# Patient Record
Sex: MALE | Race: WHITE | Employment: OTHER | URBAN - METROPOLITAN AREA
[De-identification: names, ages, dates, MRNs, and addresses within clinical notes are randomized per-mention and may not be internally consistent; named-entity substitution may affect disease eponyms.]

---

## 2017-01-16 ENCOUNTER — HOSPITAL ENCOUNTER (OUTPATIENT)
Dept: SURGERY | Age: 57
Discharge: HOME | End: 2017-01-16
Attending: SURGERY | Admitting: SURGERY

## 2017-01-16 VITALS
SYSTOLIC BLOOD PRESSURE: 126 MMHG | OXYGEN SATURATION: 94 % | HEART RATE: 66 BPM | TEMPERATURE: 96.8 F | DIASTOLIC BLOOD PRESSURE: 81 MMHG | RESPIRATION RATE: 16 BRPM

## 2017-01-16 RX ORDER — LABETALOL HYDROCHLORIDE 5 MG/ML
5 INJECTION, SOLUTION INTRAVENOUS EVERY 10 MIN PRN
Status: DISCONTINUED | OUTPATIENT
Start: 2017-01-16 | End: 2017-01-17 | Stop reason: HOSPADM

## 2017-01-16 RX ORDER — DIPHENHYDRAMINE HYDROCHLORIDE 50 MG/ML
12.5 INJECTION INTRAMUSCULAR; INTRAVENOUS
Status: ACTIVE | OUTPATIENT
Start: 2017-01-16 | End: 2017-01-16

## 2017-01-16 RX ORDER — OXYCODONE HYDROCHLORIDE AND ACETAMINOPHEN 5; 325 MG/1; MG/1
TABLET ORAL
Qty: 40 TABLET | Refills: 0 | Status: SHIPPED | OUTPATIENT
Start: 2017-01-16 | End: 2017-01-23

## 2017-01-16 RX ORDER — SODIUM CHLORIDE 0.9 % (FLUSH) 0.9 %
10 SYRINGE (ML) INJECTION EVERY 12 HOURS SCHEDULED
Status: DISCONTINUED | OUTPATIENT
Start: 2017-01-16 | End: 2017-01-17 | Stop reason: HOSPADM

## 2017-01-16 RX ORDER — PROMETHAZINE HYDROCHLORIDE 25 MG/ML
6.25 INJECTION, SOLUTION INTRAMUSCULAR; INTRAVENOUS
Status: ACTIVE | OUTPATIENT
Start: 2017-01-16 | End: 2017-01-16

## 2017-01-16 RX ORDER — MEPERIDINE HYDROCHLORIDE 50 MG/ML
12.5 INJECTION INTRAMUSCULAR; INTRAVENOUS; SUBCUTANEOUS EVERY 5 MIN PRN
Status: DISCONTINUED | OUTPATIENT
Start: 2017-01-16 | End: 2017-01-17 | Stop reason: HOSPADM

## 2017-01-16 RX ORDER — OXYCODONE HYDROCHLORIDE AND ACETAMINOPHEN 5; 325 MG/1; MG/1
1 TABLET ORAL PRN
Status: ACTIVE | OUTPATIENT
Start: 2017-01-16 | End: 2017-01-16

## 2017-01-16 RX ORDER — MORPHINE SULFATE 10 MG/ML
2 INJECTION, SOLUTION INTRAMUSCULAR; INTRAVENOUS EVERY 5 MIN PRN
Status: DISCONTINUED | OUTPATIENT
Start: 2017-01-16 | End: 2017-01-17 | Stop reason: HOSPADM

## 2017-01-16 RX ORDER — HYDRALAZINE HYDROCHLORIDE 20 MG/ML
5 INJECTION INTRAMUSCULAR; INTRAVENOUS EVERY 10 MIN PRN
Status: DISCONTINUED | OUTPATIENT
Start: 2017-01-16 | End: 2017-01-17 | Stop reason: HOSPADM

## 2017-01-16 RX ORDER — SODIUM CHLORIDE 0.9 % (FLUSH) 0.9 %
10 SYRINGE (ML) INJECTION PRN
Status: DISCONTINUED | OUTPATIENT
Start: 2017-01-16 | End: 2017-01-17 | Stop reason: HOSPADM

## 2017-01-16 RX ORDER — ONDANSETRON 2 MG/ML
4 INJECTION INTRAMUSCULAR; INTRAVENOUS
Status: ACTIVE | OUTPATIENT
Start: 2017-01-16 | End: 2017-01-16

## 2017-01-16 RX ORDER — MORPHINE SULFATE 2 MG/ML
1 INJECTION, SOLUTION INTRAMUSCULAR; INTRAVENOUS EVERY 5 MIN PRN
Status: DISCONTINUED | OUTPATIENT
Start: 2017-01-16 | End: 2017-01-17 | Stop reason: HOSPADM

## 2017-01-16 RX ORDER — SODIUM CHLORIDE, SODIUM LACTATE, POTASSIUM CHLORIDE, CALCIUM CHLORIDE 600; 310; 30; 20 MG/100ML; MG/100ML; MG/100ML; MG/100ML
INJECTION, SOLUTION INTRAVENOUS CONTINUOUS
Status: DISCONTINUED | OUTPATIENT
Start: 2017-01-16 | End: 2017-01-17 | Stop reason: HOSPADM

## 2017-01-16 RX ORDER — LIDOCAINE HYDROCHLORIDE 10 MG/ML
1 INJECTION, SOLUTION EPIDURAL; INFILTRATION; INTRACAUDAL; PERINEURAL
Status: COMPLETED | OUTPATIENT
Start: 2017-01-16 | End: 2017-01-16

## 2017-01-16 RX ORDER — OXYCODONE HYDROCHLORIDE AND ACETAMINOPHEN 5; 325 MG/1; MG/1
2 TABLET ORAL PRN
Status: ACTIVE | OUTPATIENT
Start: 2017-01-16 | End: 2017-01-16

## 2017-01-16 RX ADMIN — LIDOCAINE HYDROCHLORIDE 0.1 ML: 10 INJECTION, SOLUTION EPIDURAL; INFILTRATION; INTRACAUDAL; PERINEURAL at 08:36

## 2017-01-16 RX ADMIN — SODIUM CHLORIDE, SODIUM LACTATE, POTASSIUM CHLORIDE, CALCIUM CHLORIDE: 600; 310; 30; 20 INJECTION, SOLUTION INTRAVENOUS at 08:40

## 2017-01-16 ASSESSMENT — PAIN - FUNCTIONAL ASSESSMENT: PAIN_FUNCTIONAL_ASSESSMENT: 0-10

## 2017-01-16 ASSESSMENT — PAIN SCALES - GENERAL
PAINLEVEL_OUTOF10: 2

## 2017-06-18 PROBLEM — R55 SYNCOPE AND COLLAPSE: Status: ACTIVE | Noted: 2017-06-18

## 2017-06-19 PROBLEM — H57.11 ACUTE RIGHT EYE PAIN: Status: ACTIVE | Noted: 2017-06-19

## 2017-06-21 ENCOUNTER — TELEPHONE (OUTPATIENT)
Dept: NEUROLOGY | Age: 57
End: 2017-06-21

## 2017-06-27 ENCOUNTER — ANTI-COAG VISIT (OUTPATIENT)
Dept: PHARMACY | Facility: CLINIC | Age: 57
End: 2017-06-27

## 2017-06-27 ENCOUNTER — HOSPITAL ENCOUNTER (OUTPATIENT)
Dept: OTHER | Age: 57
Discharge: HOME | End: 2017-06-30
Attending: INTERNAL MEDICINE | Admitting: INTERNAL MEDICINE

## 2017-06-27 LAB — INR BLD: 1.5

## 2017-06-29 ENCOUNTER — ANTI-COAG VISIT (OUTPATIENT)
Dept: PHARMACY | Facility: CLINIC | Age: 57
End: 2017-06-29

## 2017-06-29 LAB — INR BLD: 2.1

## 2017-07-01 ENCOUNTER — HOSPITAL ENCOUNTER (OUTPATIENT)
Dept: OTHER | Age: 57
Discharge: HOME | End: 2017-07-31
Attending: INTERNAL MEDICINE | Admitting: INTERNAL MEDICINE

## 2017-07-03 ENCOUNTER — ANTI-COAG VISIT (OUTPATIENT)
Dept: PHARMACY | Facility: CLINIC | Age: 57
End: 2017-07-03

## 2017-07-03 LAB — INR BLD: 2.1

## 2017-07-07 ENCOUNTER — TELEPHONE (OUTPATIENT)
Dept: NEUROLOGY | Age: 57
End: 2017-07-07

## 2017-07-10 ENCOUNTER — ANTI-COAG VISIT (OUTPATIENT)
Dept: PHARMACY | Facility: CLINIC | Age: 57
End: 2017-07-10

## 2017-07-10 LAB — INR BLD: 2.1

## 2017-07-19 ENCOUNTER — OFFICE VISIT (OUTPATIENT)
Dept: OTHER | Age: 57
End: 2017-07-19

## 2017-07-19 ENCOUNTER — HOSPITAL ENCOUNTER (OUTPATIENT)
Dept: OTHER | Age: 57
Discharge: HOME | End: 2017-07-19
Attending: INTERNAL MEDICINE | Admitting: INTERNAL MEDICINE

## 2017-07-19 VITALS
HEIGHT: 72 IN | WEIGHT: 220 LBS | OXYGEN SATURATION: 97 % | DIASTOLIC BLOOD PRESSURE: 80 MMHG | SYSTOLIC BLOOD PRESSURE: 133 MMHG | BODY MASS INDEX: 29.8 KG/M2 | HEART RATE: 66 BPM

## 2017-07-19 DIAGNOSIS — I10 ESSENTIAL HYPERTENSION: ICD-10-CM

## 2017-07-19 DIAGNOSIS — E78.00 PURE HYPERCHOLESTEROLEMIA: ICD-10-CM

## 2017-07-19 DIAGNOSIS — I63.111 CEREBROVASCULAR ACCIDENT (CVA) DUE TO EMBOLISM OF RIGHT VERTEBRAL ARTERY (HCC): Primary | ICD-10-CM

## 2017-07-19 PROCEDURE — 99999 PR OFFICE/OUTPT VISIT,PROCEDURE ONLY: CPT | Performed by: INTERNAL MEDICINE

## 2017-07-19 RX ORDER — ATORVASTATIN CALCIUM 40 MG/1
40 TABLET, FILM COATED ORAL NIGHTLY
Qty: 30 TABLET | Refills: 5 | Status: SHIPPED | OUTPATIENT
Start: 2017-07-19 | End: 2017-11-13 | Stop reason: SDUPTHER

## 2017-07-19 RX ORDER — ASPIRIN 81 MG/1
81 TABLET, CHEWABLE ORAL DAILY
Qty: 30 TABLET | Refills: 5 | Status: SHIPPED | OUTPATIENT
Start: 2017-07-19 | End: 2020-03-02

## 2017-07-19 RX ORDER — WARFARIN SODIUM 7.5 MG/1
7.5 TABLET ORAL DAILY
Qty: 30 TABLET | Refills: 2 | Status: SHIPPED | OUTPATIENT
Start: 2017-07-19 | End: 2017-08-31 | Stop reason: SDUPTHER

## 2017-07-19 RX ORDER — LISINOPRIL 10 MG/1
10 TABLET ORAL DAILY
Qty: 30 TABLET | Refills: 5 | Status: SHIPPED | OUTPATIENT
Start: 2017-07-19 | End: 2017-10-02

## 2017-07-20 ENCOUNTER — OFFICE VISIT (OUTPATIENT)
Dept: NEUROLOGY | Age: 57
End: 2017-07-20

## 2017-07-20 VITALS
HEART RATE: 62 BPM | SYSTOLIC BLOOD PRESSURE: 137 MMHG | OXYGEN SATURATION: 98 % | WEIGHT: 230 LBS | HEIGHT: 72 IN | DIASTOLIC BLOOD PRESSURE: 87 MMHG | BODY MASS INDEX: 31.15 KG/M2

## 2017-07-20 DIAGNOSIS — G43.809 VARIANTS OF MIGRAINE: ICD-10-CM

## 2017-07-20 DIAGNOSIS — E78.5 DYSLIPIDEMIA: ICD-10-CM

## 2017-07-20 DIAGNOSIS — I10 HTN (HYPERTENSION), BENIGN: ICD-10-CM

## 2017-07-20 DIAGNOSIS — I63.111 CEREBROVASCULAR ACCIDENT (CVA) DUE TO EMBOLISM OF RIGHT VERTEBRAL ARTERY (HCC): Primary | ICD-10-CM

## 2017-07-20 PROCEDURE — 99214 OFFICE O/P EST MOD 30 MIN: CPT | Performed by: PSYCHIATRY & NEUROLOGY

## 2017-07-24 ENCOUNTER — ANTI-COAG VISIT (OUTPATIENT)
Dept: PHARMACY | Facility: CLINIC | Age: 57
End: 2017-07-24

## 2017-07-24 LAB — INR BLD: 1.2

## 2017-07-31 ENCOUNTER — ANTI-COAG VISIT (OUTPATIENT)
Dept: PHARMACY | Facility: CLINIC | Age: 57
End: 2017-07-31

## 2017-07-31 LAB — INR BLD: 1.7

## 2017-08-07 ENCOUNTER — ANTI-COAG VISIT (OUTPATIENT)
Dept: PHARMACY | Facility: CLINIC | Age: 57
End: 2017-08-07

## 2017-08-07 LAB — INR BLD: 1.8

## 2017-08-14 ENCOUNTER — ANTI-COAG VISIT (OUTPATIENT)
Dept: PHARMACY | Facility: CLINIC | Age: 57
End: 2017-08-14

## 2017-08-14 LAB — INR BLD: 2.8

## 2017-08-28 ENCOUNTER — ANTI-COAG VISIT (OUTPATIENT)
Dept: PHARMACY | Facility: CLINIC | Age: 57
End: 2017-08-28

## 2017-08-28 LAB — INR BLD: 1.7

## 2017-08-31 ENCOUNTER — HOSPITAL ENCOUNTER (OUTPATIENT)
Dept: OTHER | Age: 57
Discharge: HOME | End: 2017-08-31
Attending: INTERNAL MEDICINE | Admitting: INTERNAL MEDICINE

## 2017-08-31 ENCOUNTER — OFFICE VISIT (OUTPATIENT)
Dept: OTHER | Age: 57
End: 2017-08-31

## 2017-08-31 VITALS
OXYGEN SATURATION: 98 % | WEIGHT: 220 LBS | DIASTOLIC BLOOD PRESSURE: 72 MMHG | BODY MASS INDEX: 29.8 KG/M2 | HEART RATE: 70 BPM | SYSTOLIC BLOOD PRESSURE: 130 MMHG | HEIGHT: 72 IN

## 2017-08-31 DIAGNOSIS — I10 ESSENTIAL HYPERTENSION: ICD-10-CM

## 2017-08-31 DIAGNOSIS — I63.111 CEREBROVASCULAR ACCIDENT (CVA) DUE TO EMBOLISM OF RIGHT VERTEBRAL ARTERY (HCC): Primary | ICD-10-CM

## 2017-08-31 DIAGNOSIS — R05.8 COUGH DUE TO ACE INHIBITOR: ICD-10-CM

## 2017-08-31 DIAGNOSIS — T46.4X5A COUGH DUE TO ACE INHIBITOR: ICD-10-CM

## 2017-08-31 DIAGNOSIS — E78.00 PURE HYPERCHOLESTEROLEMIA: ICD-10-CM

## 2017-08-31 PROCEDURE — 99999 PR OFFICE/OUTPT VISIT,PROCEDURE ONLY: CPT | Performed by: INTERNAL MEDICINE

## 2017-08-31 RX ORDER — WARFARIN SODIUM 7.5 MG/1
7.5 TABLET ORAL DAILY
Qty: 30 TABLET | Refills: 5 | Status: SHIPPED | OUTPATIENT
Start: 2017-08-31 | End: 2017-09-25 | Stop reason: SDUPTHER

## 2017-08-31 RX ORDER — AMLODIPINE BESYLATE 5 MG/1
5 TABLET ORAL DAILY
Qty: 30 TABLET | Refills: 5 | Status: SHIPPED | OUTPATIENT
Start: 2017-08-31 | End: 2017-11-13 | Stop reason: SDUPTHER

## 2017-09-11 ENCOUNTER — ANTI-COAG VISIT (OUTPATIENT)
Dept: PHARMACY | Facility: CLINIC | Age: 57
End: 2017-09-11

## 2017-09-11 LAB — INR BLD: 2.1

## 2017-09-25 RX ORDER — WARFARIN SODIUM 7.5 MG/1
7.5 TABLET ORAL DAILY
Qty: 30 TABLET | Refills: 5 | Status: SHIPPED | OUTPATIENT
Start: 2017-09-25 | End: 2017-11-08 | Stop reason: SDUPTHER

## 2017-10-02 ENCOUNTER — ANTI-COAG VISIT (OUTPATIENT)
Dept: PHARMACY | Facility: CLINIC | Age: 57
End: 2017-10-02

## 2017-10-02 ENCOUNTER — OFFICE VISIT (OUTPATIENT)
Dept: OTHER | Age: 57
End: 2017-10-02

## 2017-10-02 ENCOUNTER — HOSPITAL ENCOUNTER (OUTPATIENT)
Dept: OTHER | Age: 57
Discharge: HOME | End: 2017-10-02
Attending: INTERNAL MEDICINE | Admitting: INTERNAL MEDICINE

## 2017-10-02 VITALS
WEIGHT: 225 LBS | DIASTOLIC BLOOD PRESSURE: 82 MMHG | HEIGHT: 72 IN | SYSTOLIC BLOOD PRESSURE: 142 MMHG | BODY MASS INDEX: 30.48 KG/M2

## 2017-10-02 DIAGNOSIS — I10 ESSENTIAL HYPERTENSION: ICD-10-CM

## 2017-10-02 DIAGNOSIS — I63.111 CEREBROVASCULAR ACCIDENT (CVA) DUE TO EMBOLISM OF RIGHT VERTEBRAL ARTERY (HCC): Primary | ICD-10-CM

## 2017-10-02 DIAGNOSIS — E78.00 PURE HYPERCHOLESTEROLEMIA: ICD-10-CM

## 2017-10-02 LAB
HBA1C MFR BLD: 5.5 %
INR BLD: 2.2

## 2017-10-02 PROCEDURE — 99999 PR OFFICE/OUTPT VISIT,PROCEDURE ONLY: CPT | Performed by: INTERNAL MEDICINE

## 2017-10-02 PROCEDURE — 83036 HEMOGLOBIN GLYCOSYLATED A1C: CPT | Performed by: INTERNAL MEDICINE

## 2017-10-02 NOTE — PROGRESS NOTES
Mr. Vickie Vincent is here for management of anticoagulation for TIA. PMH also significant for HTN and Dyslipidemia. He presents today w/out complaint. Pt verifies dosing regimen as listed above. Pt denies s/s bleeding/bruising/swelling/SOB. No BRBPR. No melena. Has had fewer of the very high Vit K foods this week  No changes in RX/OTCs/Herbal medications. Pt denies tobacco and drinks EtOH occasionally. He has been sticking to 2 servings per week of vit K. INR 2.2 is within therapeutic range of 2-3. Recommend to continue current warfarin regimen of 11.25mg daily. Patient has 7.5mg tablets. Will continue to monitor and check INR in 4 weeks. Dosing reminder card given with phone number, appointment date and time.    Return to clinic: 10/30/2017 @ 8:00am

## 2017-10-02 NOTE — PATIENT INSTRUCTIONS
1. History of stroke with vertebral artery dissection:  Continue taking warfarin (Coumadin) dosed by pharmacy, INR 2.3 (goal 2-3). Follow-up CT angiogram of the head and neck to see if dissection or stenosis improves after seen by Dr. Rodney Hernandez (neurology). Also continue taking aspirin 81 mg daily. 2. Primary Hypertension:  Blood pressure is elevated but not unreasonable today at 142/82, goal less than 130/80 with a history of dissection.  continue taking amlodipine (Norvasc) 5 mg daily to avoid ACE inhibitor induced cough. 3. Cholesterol management:  The LDL (bad cholesterol) was 118 in the hospital (goal less than 70).  Continue taking atorvastatin (Lipitor) 40 mg once per day in the evening.  We will check fasting lipid panel.      4. Headache:  Continue taking acetaminophen (Tylenol) 650 - 1000 mg within 30 minutes of the onset of headache. 5. Allergic rhinitis: This is causing post-nasal drainage induced cough and sore throat in addition to eustachian tube blockage. Use Flonase (fluticasone) nasal spray, two sprays in each nostril every night at bedtime and use nasal saline (Ocean spray) before the Flonase at bedtime and several times during the day especially in the morning to keep nasal secretions from drying out. Follow-up in six weeks  Declines influenza vaccine.

## 2017-10-02 NOTE — MR AVS SNAPSHOT
After Visit Summary             Ronda Gr   10/2/2017 9:30 AM   Office Visit    Description:  Male : 1960   Provider:  Parviz Kendall MD   Department:  [facility]              Your Follow-Up and Future Appointments         Below is a list of your follow-up and future appointments. This may not be a complete list as you may have made appointments directly with providers that we are not aware of or your providers may have made some for you. Please call your providers to confirm appointments. It is important to keep your appointments. Please bring your current insurance card, photo ID, co-pay, and all medication bottles to your appointment. If self-pay, payment is expected at the time of service. Your To-Do List     Future Appointments Provider Department Dept Phone    10/24/2017 10:00 AM Petrona Reyes MD [facility] Neurology - [facility] [phone]    Please arrive 15 minutes prior to appointment time, bring insurance card and photo ID.     10/30/2017 8:15 AM [facility] [employer] [phone]         Information from Your Visit        Department     Name Address Phone Fax    [place] [facility] [address] [phone]      You Were Seen for:         Comments    Cerebrovascular accident (CVA) due to embolism of right vertebral artery [facility])   [[id number]]         Vital Signs     Blood Pressure Height Weight Body Mass Index Smoking Status       142/82 (Site: Right Arm, Position: Sitting, Cuff Size: Large Adult) 6' (1.829 m) 225 lb (102.1 kg) 30.52 kg/m2 Former Smoker       Additional Information about your Body Mass Index (BMI)           Your BMI as listed above is considered obese (30 or more). BMI is an estimate of body fat, calculated from your height and weight.   The higher your BMI, the greater your risk of heart disease, high blood pressure,

## 2017-10-18 NOTE — TELEPHONE ENCOUNTER
Evie Jaeger called in wanting  a refill on his Warfarin, I called Breana Bahena and he has 4 refills available.  Evie Jaeger is aware

## 2017-10-24 ENCOUNTER — TELEPHONE (OUTPATIENT)
Dept: NEUROLOGY | Age: 57
End: 2017-10-24

## 2017-11-01 ENCOUNTER — HOSPITAL ENCOUNTER (OUTPATIENT)
Dept: OTHER | Age: 57
Discharge: HOME | End: 2017-11-30
Attending: PSYCHIATRY & NEUROLOGY | Admitting: PSYCHIATRY & NEUROLOGY

## 2017-11-08 ENCOUNTER — OFFICE VISIT (OUTPATIENT)
Dept: NEUROLOGY | Age: 57
End: 2017-11-08

## 2017-11-08 VITALS
OXYGEN SATURATION: 95 % | DIASTOLIC BLOOD PRESSURE: 85 MMHG | SYSTOLIC BLOOD PRESSURE: 135 MMHG | HEIGHT: 72 IN | HEART RATE: 80 BPM | BODY MASS INDEX: 31.83 KG/M2 | WEIGHT: 235 LBS

## 2017-11-08 DIAGNOSIS — I10 HTN (HYPERTENSION), BENIGN: ICD-10-CM

## 2017-11-08 DIAGNOSIS — E78.5 DYSLIPIDEMIA: ICD-10-CM

## 2017-11-08 DIAGNOSIS — G43.809 VARIANTS OF MIGRAINE: ICD-10-CM

## 2017-11-08 DIAGNOSIS — I63.111 CEREBROVASCULAR ACCIDENT (CVA) DUE TO EMBOLISM OF RIGHT VERTEBRAL ARTERY (HCC): Primary | ICD-10-CM

## 2017-11-08 PROCEDURE — 99213 OFFICE O/P EST LOW 20 MIN: CPT | Performed by: PSYCHIATRY & NEUROLOGY

## 2017-11-08 RX ORDER — FLUTICASONE PROPIONATE 50 MCG
1 SPRAY, SUSPENSION (ML) NASAL DAILY
COMMUNITY
End: 2018-02-07

## 2017-11-08 RX ORDER — WARFARIN SODIUM 7.5 MG/1
TABLET ORAL
Qty: 45 TABLET | Refills: 11 | Status: SHIPPED | OUTPATIENT
Start: 2017-11-08 | End: 2017-12-04 | Stop reason: SDUPTHER

## 2017-11-08 NOTE — PROGRESS NOTES
The patient came today for follow up regarding: recent stroke. Since his last visit, he has been doing very well. He had one episode of hematuria at night for the last three months. He is on Coumadin and aspirin. Last INR was 2.2. He is on Lipitor. He occasionally feels dizzy and lightheaded but no recent falling or injury. No weakness, numbness, dysphagia or dysarthria. No frequent headaches, neck or back pain or dysphagia or dysarthria. He denies use of drugs and he drinks socially. Other review of system was relevant for occasional cognitive impairment and short-term memory loss. No severe depression. Past Medical History:   Diagnosis Date    Cerebral artery occlusion with cerebral infarction ([facility] [address]     Hypertension     past hx     Prior to Visit Medications    Medication Sig Taking?  Authorizing Provider   warfarin (COUMADIN) 7.5 MG tablet Take 1.5 tablets by mouth daily Yes Parviz Kendall MD   fluticasone Phylliss Quinton) 50 MCG/ACT nasal spray 1 spray by Nasal route daily Yes Historical Provider, MD   amLODIPine (NORVASC) 5 MG tablet Take 1 tablet by mouth daily Yes Parviz Kendall MD   atorvastatin (LIPITOR) 40 MG tablet Take 1 tablet by mouth nightly Yes Parviz Kendall MD   aspirin 81 MG chewable tablet Take 1 tablet by mouth daily Yes Parviz Kendall MD     Allergies   Allergen Reactions    Lisinopril Other (See Comments)     Cough    Merthilate [Thimerosal]      Social History   Substance Use Topics    Smoking status: Former Smoker     Quit date: 1/10/2001    Smokeless tobacco: Never Used    Alcohol use Yes      Comment: 0-10 drinks per week     Family History   Problem Relation Age of Onset    Cancer Mother     High Blood Pressure Mother     Cancer Father     High Blood Pressure Father      Past Surgical History:   Procedure Laterality Date    HERNIA REPAIR  34/67/2540    umbilical    WISDOM TOOTH EXTRACTION           Exam:   Constitutional:   Vitals: Coumadin and baby aspirin for now  Follow INR  Follow-up on his hematuria  Repeat CTA head and neck in three month  Follow-up with me after his CTA to discuss the need to continue Coumadin versus aspirin  We also consider sleep evaluation giving history of loud snoring and EDS.

## 2017-11-13 ENCOUNTER — HOSPITAL ENCOUNTER (OUTPATIENT)
Dept: OTHER | Age: 57
Discharge: HOME | End: 2017-11-13
Attending: INTERNAL MEDICINE | Admitting: INTERNAL MEDICINE

## 2017-11-13 ENCOUNTER — OFFICE VISIT (OUTPATIENT)
Dept: OTHER | Age: 57
End: 2017-11-13

## 2017-11-13 VITALS
HEIGHT: 72 IN | DIASTOLIC BLOOD PRESSURE: 80 MMHG | HEART RATE: 77 BPM | SYSTOLIC BLOOD PRESSURE: 140 MMHG | OXYGEN SATURATION: 96 % | BODY MASS INDEX: 31.15 KG/M2 | WEIGHT: 230 LBS

## 2017-11-13 DIAGNOSIS — R06.81 APNEA: ICD-10-CM

## 2017-11-13 DIAGNOSIS — E78.00 PURE HYPERCHOLESTEROLEMIA: ICD-10-CM

## 2017-11-13 DIAGNOSIS — I10 ESSENTIAL HYPERTENSION: ICD-10-CM

## 2017-11-13 DIAGNOSIS — I63.111 CEREBROVASCULAR ACCIDENT (CVA) DUE TO EMBOLISM OF RIGHT VERTEBRAL ARTERY (HCC): Primary | ICD-10-CM

## 2017-11-13 PROCEDURE — 99999 PR OFFICE/OUTPT VISIT,PROCEDURE ONLY: CPT | Performed by: INTERNAL MEDICINE

## 2017-11-13 RX ORDER — ATORVASTATIN CALCIUM 40 MG/1
40 TABLET, FILM COATED ORAL NIGHTLY
Qty: 30 TABLET | Refills: 5 | Status: SHIPPED | OUTPATIENT
Start: 2017-11-13 | End: 2018-01-25 | Stop reason: SDUPTHER

## 2017-11-13 RX ORDER — AMLODIPINE BESYLATE 10 MG/1
10 TABLET ORAL DAILY
Qty: 30 TABLET | Refills: 5 | Status: SHIPPED | OUTPATIENT
Start: 2017-11-13 | End: 2017-12-04 | Stop reason: ALTCHOICE

## 2017-11-13 NOTE — PROGRESS NOTES
Continue taking amlodipine (Norvasc) but increase from 5 to 10 mg daily.    3. Cholesterol management:  The LDL (bad cholesterol) was 118 in the hospital (goal less than 70).  Continue taking atorvastatin (Lipitor) 40 mg once per day in the evening.  We will check fasting lipid panel before the end of the year. 4. Headache:  Continue taking acetaminophen (Tylenol) 650 - 1000 mg within 30 minutes of the onset of headache. 5. Allergic rhinitis: This is causing post-nasal drainage induced cough and sore throat in addition to eustachian tube blockage. Continue using Flonase (fluticasone) nasal spray, two sprays in each nostril every night at bedtime and use nasal saline (Ocean spray) before the Flonase at bedtime and several times during the day especially in the morning to keep nasal secretions from drying out. 6. Possible obstructive sleep apnea: We will refer to sleep clinic for evaluation. This can cause elevated blood pressure.         Follow-up in six weeks, before the end of the year

## 2017-11-14 ENCOUNTER — TELEPHONE (OUTPATIENT)
Dept: PULMONOLOGY | Age: 57
End: 2017-11-14

## 2017-11-14 NOTE — TELEPHONE ENCOUNTER
Pt cancelled NPT sleep visit on 11/16/17 with Ed Smith. Patient states he does not currently have any insurance and he cannot afford the office visit or testing at this time. I encouraged patient to call us back when he is able to get insurance to reschedule an appointment. Message also routed to referring provider, Dr. Leonel Li.

## 2017-11-22 ENCOUNTER — TELEPHONE (OUTPATIENT)
Dept: NEUROLOGY | Age: 57
End: 2017-11-22

## 2017-11-22 NOTE — TELEPHONE ENCOUNTER
Patient called stating he went to the ED yesterday for symptoms of feeling flushed, and dizzy and when he checked his bp at home, it was \"high\" and his girlfriend took him to the ED to make sure he wasn't having another TIA. ED doctor told him he was okay and that he was having the symptoms bc his PCP (Dr. Alexx Zapata) just increased his Amlodipine from 5 mg qd to 10 mg qd and that he should continue taking the med, but to call PCP to let him know. Patient called Dr. Alexx Zapata and the office is closed until 11.29.17. (I also called to see if I could get someone and there is not anyone answering calls until 11.29.17)    Patient is still feeling flushed and dizzy and did not know what else to do, so he called here asking if he should stop the Amlodipine or go back down to 5 mg, bc he didn't have any negative side effects on the 5 mg. Patient is aware that Dr. Yane Millan is not the one who should be advising him about his bp meds that Dr. Alexx Zapata put him on, but he doesn't know what else to do or how to get ahold of Dr. Alexx Zapata. Please advise. Last seen 11.08.17    Assessment:  Recent right ischemic cerebellar stroke. Could be thromboembolic from right vertebral dissection and stenosis. HTN, controlled  HLD     Plan:  Continue with the Coumadin and baby aspirin for now  Follow INR  Follow-up on his hematuria  Repeat CTA head and neck in three month  Follow-up with me after his CTA to discuss the need to continue Coumadin versus aspirin  We also consider sleep evaluation giving history of loud snoring and EDS.

## 2017-11-22 NOTE — TELEPHONE ENCOUNTER
Agree to your recommendation as it is not up to me to change or stop BP medications.  He needs to check his BP daily though

## 2017-11-30 ENCOUNTER — ANTI-COAG VISIT (OUTPATIENT)
Dept: PHARMACY | Facility: CLINIC | Age: 57
End: 2017-11-30

## 2017-11-30 LAB — INR BLD: 1.8

## 2017-11-30 NOTE — PROGRESS NOTES
Mr. Nick Ma is here for management of anticoagulation for TIA. PMH also significant for HTN and Dyslipidemia. He presents today w/out complaint. Pt verifies dosing regimen as listed above. Pt denies s/s bleeding/bruising/swelling/SOB. No BRBPR. No melena. Has had fewer of the very high Vit K foods this week  No changes in RX/OTCs/Herbal medications. Pt denies tobacco and drinks EtOH occasionally. He has been sticking to 2 servings per week of vit K. Went to ER for tachycardia, 11/21, stopped the norvasc and started lisinopril again. Missed last Saturday and Sunday's dose    INR 1.8 is slightly below acceptable therapeutic range of 2-3. D/t missed doses. Recommend to continue current warfarin regimen of 11.25mg daily. Patient has 7.5mg tablets. Will continue to monitor and check INR in 4 weeks. Dosing reminder card given with phone number, appointment date and time. Return to clinic: 12/28 @ 8:15am    C[address]. D Candidate 2018    I have seen the patient and reviewed the progress note written by the PharmD Candidate. I agree with this assessment and plan.    Misa Seals, FrancescaD 11/30/2017 10:24 AM

## 2017-12-01 ENCOUNTER — HOSPITAL ENCOUNTER (OUTPATIENT)
Dept: OTHER | Age: 57
Discharge: HOME | End: 2017-12-31
Attending: PSYCHIATRY & NEUROLOGY | Admitting: PSYCHIATRY & NEUROLOGY

## 2017-12-04 ENCOUNTER — OFFICE VISIT (OUTPATIENT)
Dept: OTHER | Age: 57
End: 2017-12-04

## 2017-12-04 ENCOUNTER — HOSPITAL ENCOUNTER (OUTPATIENT)
Dept: OTHER | Age: 57
Discharge: HOME | End: 2017-12-04
Attending: INTERNAL MEDICINE | Admitting: INTERNAL MEDICINE

## 2017-12-04 VITALS
HEART RATE: 65 BPM | WEIGHT: 230 LBS | DIASTOLIC BLOOD PRESSURE: 68 MMHG | SYSTOLIC BLOOD PRESSURE: 120 MMHG | HEIGHT: 72 IN | OXYGEN SATURATION: 97 % | BODY MASS INDEX: 31.15 KG/M2

## 2017-12-04 DIAGNOSIS — E78.00 PURE HYPERCHOLESTEROLEMIA: ICD-10-CM

## 2017-12-04 DIAGNOSIS — I10 ESSENTIAL HYPERTENSION: ICD-10-CM

## 2017-12-04 DIAGNOSIS — I63.111 CEREBROVASCULAR ACCIDENT (CVA) DUE TO EMBOLISM OF RIGHT VERTEBRAL ARTERY (HCC): Primary | ICD-10-CM

## 2017-12-04 DIAGNOSIS — T46.4X5A COUGH DUE TO ACE INHIBITOR: ICD-10-CM

## 2017-12-04 DIAGNOSIS — R05.8 COUGH DUE TO ACE INHIBITOR: ICD-10-CM

## 2017-12-04 DIAGNOSIS — R06.81 APNEA: ICD-10-CM

## 2017-12-04 PROCEDURE — 99999 PR OFFICE/OUTPT VISIT,PROCEDURE ONLY: CPT | Performed by: INTERNAL MEDICINE

## 2017-12-04 RX ORDER — WARFARIN SODIUM 7.5 MG/1
TABLET ORAL
Qty: 45 TABLET | Refills: 11 | Status: SHIPPED | OUTPATIENT
Start: 2017-12-04 | End: 2018-05-17 | Stop reason: SDUPTHER

## 2017-12-04 RX ORDER — LOSARTAN POTASSIUM 50 MG/1
50 TABLET ORAL DAILY
Qty: 30 TABLET | Refills: 5 | Status: SHIPPED | OUTPATIENT
Start: 2017-12-04 | End: 2017-12-13

## 2017-12-04 RX ORDER — LISINOPRIL 10 MG/1
10 TABLET ORAL DAILY
COMMUNITY
End: 2017-12-04

## 2017-12-04 NOTE — PATIENT INSTRUCTIONS
1. History of stroke with vertebral artery dissection:  Continue taking warfarin (Coumadin) dosed by pharmacy, INR 1.8 (goal 2-3).  Follow-up CT angiogram of the head and neck to see if dissection or stenosis improves after the first of the year according to Dr. Aleksandr Govea (neurology). Yudelka Sylvester continue taking aspirin 81 mg daily. 2. Primary Hypertension:  Blood pressure is excellent today 120/68, goal less than 130/80 with a history of dissection.  Since the lisinopril controls blood pressure well but causes a cough, instead of lisinopril take losartan (Cozaar) 50 mg tablet once per day. Stay off the amlodipine (Norvasc) for now. 3. Cholesterol management:  Continue taking atorvastatin (Lipitor) 40 mg once per day in the evening.  We will check fasting lipid panel after the first of the year. 4. Headache:  Continue taking acetaminophen (Tylenol) 650 - 1000 mg within 30 minutes of the onset of headache.    5. Allergic rhinitis: This is causing post-nasal drainage induced cough and sore throat in addition to eustachian tube blockage. Continue using Flonase (fluticasone) nasal spray, two sprays in each nostril every night at bedtime and use nasal saline (Ocean spray) before the Flonase at bedtime and several times during the day especially in the morning to keep nasal secretions from drying out.    6. Possible obstructive sleep apnea:  Continue to use the 'Breath Rite' strips and nasal spray.        Follow-up in six weeks, in mid January

## 2017-12-13 ENCOUNTER — TELEPHONE (OUTPATIENT)
Dept: OTHER | Age: 57
End: 2017-12-13

## 2017-12-13 RX ORDER — LISINOPRIL 10 MG/1
10 TABLET ORAL DAILY
Qty: 30 TABLET | Refills: 5 | Status: SHIPPED | OUTPATIENT
Start: 2017-12-13 | End: 2018-05-17 | Stop reason: SDUPTHER

## 2017-12-28 ENCOUNTER — ANTI-COAG VISIT (OUTPATIENT)
Dept: PHARMACY | Facility: CLINIC | Age: 57
End: 2017-12-28

## 2017-12-28 LAB — INR BLD: 2.8

## 2017-12-28 NOTE — PROGRESS NOTES
Mr. Guero Miramontes is here for management of anticoagulation for TIA. PMH also significant for HTN and Dyslipidemia. He presents today w/out complaint. Pt verifies dosing regimen as listed above. Pt denies s/s bleeding/bruising/swelling/SOB. No BRBPR. No melena. No changes in RX/OTCs/Herbal medications. Pt denies tobacco and drinks EtOH occasionally. He has been sticking to 2 servings per week of vit K. He has had a headache that will not go away for about 5 days. Tylenol has not been helping. Advised that he could try ibuprofen for a couple days but if not improved to contact MD.    INR 2.8 is within acceptable therapeutic range of 2-3. Recommend to continue current warfarin regimen of 11.25mg daily. Patient has 7.5mg tablets. Will continue to monitor and check INR in 4 weeks. Dosing reminder card given with phone number, appointment date and time.    Return to clinic: 1/25 @ 8:30am    Fazal Pino PharmD 8:06 AM 12/28/17

## 2018-01-01 ENCOUNTER — HOSPITAL ENCOUNTER (OUTPATIENT)
Dept: OTHER | Age: 58
Discharge: HOME | End: 2018-01-31
Attending: PSYCHIATRY & NEUROLOGY | Admitting: PSYCHIATRY & NEUROLOGY

## 2018-01-17 ENCOUNTER — OFFICE VISIT (OUTPATIENT)
Dept: OTHER | Age: 58
End: 2018-01-17

## 2018-01-17 ENCOUNTER — HOSPITAL ENCOUNTER (OUTPATIENT)
Dept: OTHER | Age: 58
Discharge: HOME | End: 2018-01-17
Attending: INTERNAL MEDICINE | Admitting: INTERNAL MEDICINE

## 2018-01-17 VITALS
DIASTOLIC BLOOD PRESSURE: 70 MMHG | SYSTOLIC BLOOD PRESSURE: 102 MMHG | HEIGHT: 72 IN | BODY MASS INDEX: 31.15 KG/M2 | WEIGHT: 230 LBS

## 2018-01-17 DIAGNOSIS — G44.89 OTHER HEADACHE SYNDROME: Primary | ICD-10-CM

## 2018-01-17 DIAGNOSIS — I63.111 CEREBROVASCULAR ACCIDENT (CVA) DUE TO EMBOLISM OF RIGHT VERTEBRAL ARTERY (HCC): ICD-10-CM

## 2018-01-17 PROCEDURE — 99999 PR OFFICE/OUTPT VISIT,PROCEDURE ONLY: CPT | Performed by: INTERNAL MEDICINE

## 2018-01-17 RX ORDER — AMITRIPTYLINE HYDROCHLORIDE 25 MG/1
25 TABLET, FILM COATED ORAL NIGHTLY
Qty: 30 TABLET | Refills: 3 | Status: SHIPPED | OUTPATIENT
Start: 2018-01-17 | End: 2018-02-14

## 2018-01-17 ASSESSMENT — ENCOUNTER SYMPTOMS
SHORTNESS OF BREATH: 0
COUGH: 0

## 2018-01-17 NOTE — PROGRESS NOTES
Subjective:      Patient ID: Gabriel Carter is a 61 y.o. male. HPI patient in for reevaluation of ongoing right-sided headache and symptoms of dizziness or lightheadedness. Patient had a cerebellar stroke in June 2017. Since that time he's had problems with dizziness and the right-sided headache. He has had follow-up CAT scans done which did not show any new acute changes. Stroke was due to a vertebral artery dissection. Since that time he's been controlled with blood pressure medicines cholesterol medicines Coumadin and aspirin. Review of Systems   Respiratory: Negative for cough and shortness of breath. Cardiovascular: Negative for chest pain and palpitations. All other systems reviewed and are negative. Objective:   Physical Exam   Constitutional: He is oriented to person, place, and time. He appears well-developed and well-nourished. HENT:   Head: Normocephalic and atraumatic. He did have mild all this and fullness of his right TM. No erythema. he did have cerumen obstruction of his left outer canal and TM was not well visualized   Eyes: Pupils are equal, round, and reactive to light. Mild rotational nystagmus in right lateral gaze   Neck: Normal range of motion. Cardiovascular: Normal rate, regular rhythm, normal heart sounds and intact distal pulses. No murmur heard. Pulmonary/Chest: Effort normal and breath sounds normal.   Neurological: He is alert and oriented to person, place, and time. No cranial nerve deficit. Coordination normal.       Assessment:      1. Right-sided headache. Patient does not show any evidence of temporal artery tenderness and headache tends to be persistent, making it unlikely to be a migraine type headache. Certainly some of this may be stress related and decision was made to try amitriptyline 25 mg at bedtime. He will call in 10-14 days if not improving. 2.  Dizziness.   This may be from previous cerebellar infarct, but also he does have

## 2018-01-25 ENCOUNTER — ANTI-COAG VISIT (OUTPATIENT)
Dept: PHARMACY | Facility: CLINIC | Age: 58
End: 2018-01-25

## 2018-01-25 LAB — INR BLD: 2.4

## 2018-01-25 RX ORDER — ATORVASTATIN CALCIUM 40 MG/1
40 TABLET, FILM COATED ORAL NIGHTLY
Qty: 30 TABLET | Refills: 5 | Status: SHIPPED | OUTPATIENT
Start: 2018-01-25 | End: 2018-05-17 | Stop reason: SDUPTHER

## 2018-02-01 ENCOUNTER — HOSPITAL ENCOUNTER (OUTPATIENT)
Dept: OTHER | Age: 58
Discharge: HOME | End: 2018-02-28
Attending: PSYCHIATRY & NEUROLOGY | Admitting: PSYCHIATRY & NEUROLOGY

## 2018-02-07 ENCOUNTER — OFFICE VISIT (OUTPATIENT)
Dept: NEUROLOGY | Age: 58
End: 2018-02-07

## 2018-02-07 VITALS
OXYGEN SATURATION: 96 % | HEIGHT: 72 IN | DIASTOLIC BLOOD PRESSURE: 81 MMHG | HEART RATE: 68 BPM | WEIGHT: 231 LBS | BODY MASS INDEX: 31.29 KG/M2 | SYSTOLIC BLOOD PRESSURE: 124 MMHG

## 2018-02-07 DIAGNOSIS — E78.5 DYSLIPIDEMIA: ICD-10-CM

## 2018-02-07 DIAGNOSIS — G43.809 VARIANTS OF MIGRAINE: ICD-10-CM

## 2018-02-07 DIAGNOSIS — I10 HTN (HYPERTENSION), BENIGN: ICD-10-CM

## 2018-02-07 DIAGNOSIS — I63.111 CEREBROVASCULAR ACCIDENT (CVA) DUE TO EMBOLISM OF RIGHT VERTEBRAL ARTERY (HCC): Primary | ICD-10-CM

## 2018-02-07 DIAGNOSIS — G45.0 VERTEBRAL ARTERY SYNDROME: ICD-10-CM

## 2018-02-07 PROCEDURE — 99214 OFFICE O/P EST MOD 30 MIN: CPT | Performed by: PSYCHIATRY & NEUROLOGY

## 2018-02-07 ASSESSMENT — ENCOUNTER SYMPTOMS
EYES NEGATIVE: 1
GASTROINTESTINAL NEGATIVE: 1
RESPIRATORY NEGATIVE: 1

## 2018-02-07 NOTE — PROGRESS NOTES
The patient came today for follow up regarding: Recent stroke. Since the patient's last visit, he continues to take ASA and Coumadin. No SE from these medications. He denies any residual deficits from his recent stroke. His INR has been controlled. He is on statin at night. He has occasional right occipital pain and neck pain. Symptoms are waxing and waning and can be moderate. Duration is minutes and triggers. No other associated symptoms. He takes NSAID PRN. He denies any weakness or numbness or tingling or speech or swallowing issues. He is on lisinopril for hypertension. Had some changes with his blood pressure medications recently. He was started on Amitriptyline 25 mg at night about a week ago. No side effect from the medicine. Denies any sleep disturbance or recent trauma. Other review of system was unremarkable. Past Medical History:   Diagnosis Date    Cerebral artery occlusion with cerebral infarction ([facility] [address] 05/2017    Hypertension     past hx     Prior to Visit Medications    Medication Sig Taking?  Authorizing Provider   atorvastatin (LIPITOR) 40 MG tablet Take 1 tablet by mouth nightly Yes Don Hannah MD   amitriptyline (ELAVIL) 25 MG tablet Take 1 tablet by mouth nightly Yes Antonio Saint, MD   lisinopril (PRINIVIL;ZESTRIL) 10 MG tablet Take 1 tablet by mouth daily Yes Don Hannah MD   warfarin (COUMADIN) 7.5 MG tablet Take 1.5 tablets by mouth daily Yes Don Hannah MD   aspirin 81 MG chewable tablet Take 1 tablet by mouth daily Yes Don Hannah MD     Allergies   Allergen Reactions    Lisinopril Other (See Comments)     Cough    Losartan Other (See Comments)     flushing    Merthilate [Thimerosal]      Social History   Substance Use Topics    Smoking status: Former Smoker     Quit date: 1/10/2001    Smokeless tobacco: Never Used    Alcohol use Yes      Comment: 0-10 drinks per week     Family History   Problem Relation Age of Onset

## 2018-02-14 ENCOUNTER — HOSPITAL ENCOUNTER (OUTPATIENT)
Dept: OTHER | Age: 58
Discharge: HOME | End: 2018-02-14
Attending: INTERNAL MEDICINE | Admitting: INTERNAL MEDICINE

## 2018-02-14 ENCOUNTER — OFFICE VISIT (OUTPATIENT)
Dept: OTHER | Age: 58
End: 2018-02-14

## 2018-02-14 VITALS
SYSTOLIC BLOOD PRESSURE: 120 MMHG | BODY MASS INDEX: 31.15 KG/M2 | OXYGEN SATURATION: 99 % | DIASTOLIC BLOOD PRESSURE: 74 MMHG | HEIGHT: 72 IN | HEART RATE: 71 BPM | WEIGHT: 230 LBS

## 2018-02-14 DIAGNOSIS — G44.89 OTHER HEADACHE SYNDROME: Primary | ICD-10-CM

## 2018-02-14 DIAGNOSIS — E78.00 PURE HYPERCHOLESTEROLEMIA: ICD-10-CM

## 2018-02-14 DIAGNOSIS — M54.81 OCCIPITAL NEURITIS: ICD-10-CM

## 2018-02-14 DIAGNOSIS — I10 ESSENTIAL HYPERTENSION: ICD-10-CM

## 2018-02-14 PROCEDURE — 99999 PR OFFICE/OUTPT VISIT,PROCEDURE ONLY: CPT | Performed by: INTERNAL MEDICINE

## 2018-02-14 RX ORDER — GABAPENTIN 300 MG/1
300 CAPSULE ORAL 2 TIMES DAILY
Qty: 60 CAPSULE | Refills: 3 | Status: SHIPPED | OUTPATIENT
Start: 2018-02-14 | End: 2018-05-17 | Stop reason: DRUGHIGH

## 2018-02-14 ASSESSMENT — ENCOUNTER SYMPTOMS
COUGH: 0
SHORTNESS OF BREATH: 0

## 2018-03-06 ENCOUNTER — TELEPHONE (OUTPATIENT)
Dept: NEUROLOGY | Age: 58
End: 2018-03-06

## 2018-03-06 ENCOUNTER — ANTI-COAG VISIT (OUTPATIENT)
Dept: PHARMACY | Facility: CLINIC | Age: 58
End: 2018-03-06

## 2018-03-06 ENCOUNTER — HOSPITAL ENCOUNTER (OUTPATIENT)
Dept: OTHER | Age: 58
Discharge: HOME | End: 2018-03-31
Attending: PSYCHIATRY & NEUROLOGY | Admitting: PSYCHIATRY & NEUROLOGY

## 2018-03-06 DIAGNOSIS — I63.111 CEREBROVASCULAR ACCIDENT (CVA) DUE TO EMBOLISM OF RIGHT VERTEBRAL ARTERY (HCC): ICD-10-CM

## 2018-03-06 DIAGNOSIS — I10 HTN (HYPERTENSION), BENIGN: Primary | ICD-10-CM

## 2018-03-06 LAB — INR BLD: 2.4

## 2018-03-06 NOTE — TELEPHONE ENCOUNTER
Pt scheduled his cta but since he is hypertensive needs blood work. Bun, creatinine, and gfr.  Please advise

## 2018-03-07 ENCOUNTER — OFFICE VISIT (OUTPATIENT)
Dept: NEUROLOGY | Age: 58
End: 2018-03-07

## 2018-03-07 VITALS
OXYGEN SATURATION: 97 % | HEART RATE: 61 BPM | DIASTOLIC BLOOD PRESSURE: 81 MMHG | BODY MASS INDEX: 31.42 KG/M2 | WEIGHT: 232 LBS | HEIGHT: 72 IN | SYSTOLIC BLOOD PRESSURE: 133 MMHG

## 2018-03-07 DIAGNOSIS — G45.0 VERTEBRAL ARTERY SYNDROME: ICD-10-CM

## 2018-03-07 DIAGNOSIS — I10 HTN (HYPERTENSION), BENIGN: ICD-10-CM

## 2018-03-07 DIAGNOSIS — E78.5 DYSLIPIDEMIA: ICD-10-CM

## 2018-03-07 DIAGNOSIS — I63.111 CEREBROVASCULAR ACCIDENT (CVA) DUE TO EMBOLISM OF RIGHT VERTEBRAL ARTERY (HCC): Primary | ICD-10-CM

## 2018-03-07 PROCEDURE — 99213 OFFICE O/P EST LOW 20 MIN: CPT | Performed by: PSYCHIATRY & NEUROLOGY

## 2018-03-07 ASSESSMENT — ENCOUNTER SYMPTOMS
GASTROINTESTINAL NEGATIVE: 1
RESPIRATORY NEGATIVE: 1
EYES NEGATIVE: 1

## 2018-03-07 NOTE — PROGRESS NOTES
The patient came today for follow up regarding: recent stroke and hx of right vertebral occlusion    Since the patient's last visit, he continues take Coumadin, Lipitor and baby aspirin. He is scheduled for CTA of the head and neck by Friday. He denies any new symptoms today. He has occasional right occipital pain. Pain is sharp\" and can last for few minutes. No clear triggers. Degree is moderate. Frequency could be few times a week. No other associated symptoms. No visual disturbance, neck pain, weakness or numbness. Last CTA neck showed right vertebral occlusion. Blood pressure is controlled on medication. He was started on gabapentin recently 300 mg twice daily for his neuropathic pain. No other new symptoms today. Other review of system was unremarkable. Past Medical History:   Diagnosis Date    Cerebral artery occlusion with cerebral infarction ([facility] [address] 05/2017    Hypertension     past hx     Prior to Visit Medications    Medication Sig Taking? Authorizing Provider   gabapentin (NEURONTIN) 300 MG capsule Take 1 capsule by mouth 2 times daily for 150 days.  Yes Kolby Bonilla MD   atorvastatin (LIPITOR) 40 MG tablet Take 1 tablet by mouth nightly Yes Seble Marinelli MD   lisinopril (PRINIVIL;ZESTRIL) 10 MG tablet Take 1 tablet by mouth daily Yes Seble Marinelli MD   warfarin (COUMADIN) 7.5 MG tablet Take 1.5 tablets by mouth daily Yes Seble Marinelli MD   aspirin 81 MG chewable tablet Take 1 tablet by mouth daily Yes Seble Marinelli MD     Allergies   Allergen Reactions    Lisinopril Other (See Comments)     Cough    Losartan Other (See Comments)     flushing    Merthilate [Thimerosal]      Social History   Substance Use Topics    Smoking status: Former Smoker     Quit date: 1/10/2001    Smokeless tobacco: Never Used    Alcohol use Yes      Comment: 0-10 drinks per week     Family History   Problem Relation Age of Onset    Cancer Mother     High Blood Pressure Mother and risk of stroke recurrence off and on anticoagulation.   Blood pressure monitor  Continue lisinopril  Continue gabapentin  Follow-up with me after his CTA

## 2018-03-09 ENCOUNTER — HOSPITAL ENCOUNTER (OUTPATIENT)
Dept: CT IMAGING | Age: 58
Discharge: HOME | End: 2018-03-09
Attending: PSYCHIATRY & NEUROLOGY | Admitting: PSYCHIATRY & NEUROLOGY

## 2018-03-09 DIAGNOSIS — G45.0 VERTEBRAL ARTERY SYNDROME: ICD-10-CM

## 2018-03-09 DIAGNOSIS — I63.111 CEREBROVASCULAR ACCIDENT (CVA) DUE TO EMBOLISM OF RIGHT VERTEBRAL ARTERY (HCC): ICD-10-CM

## 2018-03-09 DIAGNOSIS — I63.111 CEREBRAL INFARCTION DUE TO EMBOLISM OF RIGHT VERTEBRAL ARTERY (HCC): ICD-10-CM

## 2018-03-09 LAB
BUN BLDV-MCNC: 11 MG/DL (ref 7–20)
CREAT SERPL-MCNC: 0.8 MG/DL (ref 0.9–1.3)
GFR AFRICAN AMERICAN: >60
GFR NON-AFRICAN AMERICAN: >60
SEDIMENTATION RATE, ERYTHROCYTE: 10 MM/HR (ref 0–20)

## 2018-03-14 ENCOUNTER — TELEPHONE (OUTPATIENT)
Dept: NEUROLOGY | Age: 58
End: 2018-03-14

## 2018-04-01 ENCOUNTER — HOSPITAL ENCOUNTER (OUTPATIENT)
Dept: OTHER | Age: 58
Discharge: HOME | End: 2018-04-30
Attending: PSYCHIATRY & NEUROLOGY | Admitting: PSYCHIATRY & NEUROLOGY

## 2018-04-12 ENCOUNTER — ANTI-COAG VISIT (OUTPATIENT)
Dept: PHARMACY | Facility: CLINIC | Age: 58
End: 2018-04-12

## 2018-04-12 LAB — INR BLD: 1.8

## 2018-05-01 ENCOUNTER — HOSPITAL ENCOUNTER (OUTPATIENT)
Dept: OTHER | Age: 58
Discharge: HOME | End: 2018-05-31
Attending: PSYCHIATRY & NEUROLOGY | Admitting: PSYCHIATRY & NEUROLOGY

## 2018-05-10 ENCOUNTER — ANTI-COAG VISIT (OUTPATIENT)
Dept: PHARMACY | Facility: CLINIC | Age: 58
End: 2018-05-10

## 2018-05-10 LAB — INR BLD: 2.6

## 2018-05-17 ENCOUNTER — OFFICE VISIT (OUTPATIENT)
Dept: OTHER | Age: 58
End: 2018-05-17

## 2018-05-17 ENCOUNTER — HOSPITAL ENCOUNTER (OUTPATIENT)
Dept: OTHER | Age: 58
Discharge: HOME | End: 2018-05-17
Attending: INTERNAL MEDICINE | Admitting: INTERNAL MEDICINE

## 2018-05-17 VITALS
OXYGEN SATURATION: 97 % | BODY MASS INDEX: 31.15 KG/M2 | WEIGHT: 230 LBS | HEIGHT: 72 IN | HEART RATE: 59 BPM | SYSTOLIC BLOOD PRESSURE: 110 MMHG | DIASTOLIC BLOOD PRESSURE: 78 MMHG

## 2018-05-17 DIAGNOSIS — I10 HTN (HYPERTENSION), BENIGN: ICD-10-CM

## 2018-05-17 DIAGNOSIS — R51.9 CHRONIC NONINTRACTABLE HEADACHE, UNSPECIFIED HEADACHE TYPE: Primary | ICD-10-CM

## 2018-05-17 DIAGNOSIS — I63.111 CEREBROVASCULAR ACCIDENT (CVA) DUE TO EMBOLISM OF RIGHT VERTEBRAL ARTERY (HCC): ICD-10-CM

## 2018-05-17 DIAGNOSIS — G89.29 CHRONIC NONINTRACTABLE HEADACHE, UNSPECIFIED HEADACHE TYPE: Primary | ICD-10-CM

## 2018-05-17 DIAGNOSIS — I65.01 OCCLUSION AND STENOSIS OF RIGHT VERTEBRAL ARTERY: ICD-10-CM

## 2018-05-17 DIAGNOSIS — G45.0 VERTEBRAL ARTERY SYNDROME: ICD-10-CM

## 2018-05-17 PROCEDURE — 99999 PR OFFICE/OUTPT VISIT,PROCEDURE ONLY: CPT | Performed by: INTERNAL MEDICINE

## 2018-05-17 RX ORDER — GABAPENTIN 300 MG/1
300 CAPSULE ORAL 2 TIMES DAILY
Qty: 60 CAPSULE | Refills: 3 | Status: CANCELLED | OUTPATIENT
Start: 2018-05-17 | End: 2018-10-14

## 2018-05-17 RX ORDER — GABAPENTIN 100 MG/1
100 CAPSULE ORAL NIGHTLY
Qty: 90 CAPSULE | Refills: 3 | Status: SHIPPED | OUTPATIENT
Start: 2018-05-17 | End: 2018-05-17 | Stop reason: CLARIF

## 2018-05-17 RX ORDER — LISINOPRIL 10 MG/1
10 TABLET ORAL DAILY
Qty: 30 TABLET | Refills: 5 | Status: SHIPPED | OUTPATIENT
Start: 2018-05-17 | End: 2018-10-18 | Stop reason: SDUPTHER

## 2018-05-17 RX ORDER — WARFARIN SODIUM 7.5 MG/1
TABLET ORAL
Qty: 45 TABLET | Refills: 11 | Status: SHIPPED | OUTPATIENT
Start: 2018-05-17 | End: 2018-07-12 | Stop reason: SDDI

## 2018-05-17 RX ORDER — ATORVASTATIN CALCIUM 40 MG/1
40 TABLET, FILM COATED ORAL NIGHTLY
Qty: 30 TABLET | Refills: 5 | Status: SHIPPED | OUTPATIENT
Start: 2018-05-17 | End: 2018-07-21 | Stop reason: SDUPTHER

## 2018-05-17 RX ORDER — GABAPENTIN 100 MG/1
100 CAPSULE ORAL NIGHTLY
Qty: 30 CAPSULE | Refills: 3 | Status: SHIPPED | OUTPATIENT
Start: 2018-05-17 | End: 2020-08-25

## 2018-06-01 ENCOUNTER — HOSPITAL ENCOUNTER (OUTPATIENT)
Dept: OTHER | Age: 58
Discharge: HOME | End: 2018-06-30
Attending: PSYCHIATRY & NEUROLOGY | Admitting: PSYCHIATRY & NEUROLOGY

## 2018-07-12 ENCOUNTER — OFFICE VISIT (OUTPATIENT)
Dept: OTHER | Age: 58
End: 2018-07-12

## 2018-07-12 VITALS
OXYGEN SATURATION: 96 % | DIASTOLIC BLOOD PRESSURE: 78 MMHG | HEIGHT: 72 IN | WEIGHT: 230 LBS | HEART RATE: 70 BPM | SYSTOLIC BLOOD PRESSURE: 120 MMHG | BODY MASS INDEX: 31.15 KG/M2

## 2018-07-12 DIAGNOSIS — G45.0 VERTEBRAL ARTERY SYNDROME: ICD-10-CM

## 2018-07-12 DIAGNOSIS — R53.83 FATIGUE, UNSPECIFIED TYPE: Primary | ICD-10-CM

## 2018-07-12 DIAGNOSIS — I10 HTN (HYPERTENSION), BENIGN: ICD-10-CM

## 2018-07-12 DIAGNOSIS — I63.111 CEREBROVASCULAR ACCIDENT (CVA) DUE TO EMBOLISM OF RIGHT VERTEBRAL ARTERY (HCC): ICD-10-CM

## 2018-07-12 PROCEDURE — 99999 PR OFFICE/OUTPT VISIT,PROCEDURE ONLY: CPT | Performed by: INTERNAL MEDICINE

## 2018-07-12 NOTE — PROGRESS NOTES
SUBJECTIVE:  Mr. Brody Shine is a 62-year-old  male with past medical history of hypertension, right ischemic cerebellar stroke in June 2017 with no residual deficits (follows with Dr. Yoana Lawrence - last seen on 3/7/18 and had CTA head and neck on 3/9/18 ), right vertebral artery occlusion for which he had an appointment at [facility] on 5/21/18 . Patient here at [address] [place] [address] today for his one-month follow-up . Patient reports that he was advised to be off of either aspirin/warfarin and he wished to discontinue Coumadin. He stopped Coumadin about 3 weeks ago. Currently denies any headache or any other symptoms. Continue to complain of easy fatigability and \" memory issues \" . Reports that he forgot to get blood work (TSH, vitamin D levels  ) done from last time . I also discussed with the patient regarding routine health maintenance exams including colonoscopy, which patient reports that he never had one done. He denies any family history of colon cancer . Denies any \" blood in the stool\". Asked about  [address] [place] [address] Street as he does not want Colonoscopy . Discussed about benefits/risks of each and advised follow-up with GI for more information. Patient stated that he would think about it. Chief Complaint   Patient presents with    Follow-up     2 month follow up  htn/tinglng hands/feet       MEDICATIONS:  Current Outpatient Prescriptions   Medication Sig Dispense Refill    lisinopril (PRINIVIL;ZESTRIL) 10 MG tablet Take 1 tablet by mouth daily 30 tablet 5    atorvastatin (LIPITOR) 40 MG tablet Take 1 tablet by mouth nightly 30 tablet 5    aspirin 81 MG chewable tablet Take 1 tablet by mouth daily 30 tablet 5    gabapentin (NEURONTIN) 100 MG capsule Take 1 capsule by mouth nightly for 30 days. . 30 capsule 3       Lab Results   Component Value Date    LABA1C 5.5 10/02/2017     Lab Results   Component Value Date    WBC 9.9 11/21/2017    HGB 15.1 11/21/2017     11/21/2017     Lab Results Component Value Date     11/21/2017    K 3.4 (L) 11/21/2017    CL 98 (L) 11/21/2017    CO2 28 11/21/2017    BUN 11 03/09/2018    CREATININE 0.8 (L) 03/09/2018    GLUCOSE 155 (H) 11/21/2017       OBJECTIVE:    /78 (Site: Right Arm, Position: Sitting, Cuff Size: Large Adult)   Pulse 70   Ht 6' (1.829 m)   Wt 230 lb (104.3 kg)   SpO2 96%   BMI 31.19 kg/m²     Wt Readings from Last 3 Encounters:   07/12/18 230 lb (104.3 kg)   05/17/18 230 lb (104.3 kg)   03/07/18 232 lb (105.2 kg)       HEENT:  Oropharynx clear, no lymphadenopathy,   LUNGS:  Clear and without wheezes  HEART:  Regular rhythm, no appreciable murmur  ABD:  Benign, active bowel sounds  EXT:  No edema, pulses palpable  NEURO:  No focal neurologic deficits noted. ASSESSMENT / PLAN:   1. Right vertebral artery occlusion with history of right ischemic cerebellar stroke (6/2017) - follows with Dr. Shanique Landaverde (last seen 3/7/18) and had a follow-up CTA head and neck on 3/19/18 suggestive of worsening of near complete occlusion of the right vertebral artery. He was recommended to follow-up at [facility] , where he was evaluated on  5/21/18 and recommended to discontinue Coumadin. Recommended serial follow-up visits at this time without any intervention planned (per patient)   - Patient currently on aspirin, statin      2. HTN - optimal control on lisinopril 20 mg daily     3 . Right leg tingling numbness - stable on Neurontin 100 mg nightly      4.  Headache , right-sided in the setting of right vertebral artery occlusion - ESR on 3/19/18 normal ; continue NSAIDs when necessary     5.  Easy  fatigability  - New Problem reported on 5/17/18 - check TSH, vitamin D levels - patient forgot to get blood work done from last visit .   Encouraged him to get them done today     Follow-up in 3 months    Myriam Camacho MD  Hospitalist Physician

## 2018-07-17 DIAGNOSIS — R51.9 CHRONIC NONINTRACTABLE HEADACHE, UNSPECIFIED HEADACHE TYPE: ICD-10-CM

## 2018-07-17 DIAGNOSIS — R53.83 FATIGUE, UNSPECIFIED TYPE: ICD-10-CM

## 2018-07-17 DIAGNOSIS — G89.29 CHRONIC NONINTRACTABLE HEADACHE, UNSPECIFIED HEADACHE TYPE: ICD-10-CM

## 2018-07-17 LAB
VITAMIN D 25-HYDROXY: 43.6
VITAMIN D2, 25 HYDROXY: NORMAL
VITAMIN D3,25 HYDROXY: NORMAL

## 2018-07-17 PROCEDURE — 36415 COLL VENOUS BLD VENIPUNCTURE: CPT | Performed by: INTERNAL MEDICINE

## 2018-07-23 RX ORDER — ATORVASTATIN CALCIUM 40 MG/1
TABLET, FILM COATED ORAL
Qty: 90 TABLET | Refills: 5 | Status: SHIPPED | OUTPATIENT
Start: 2018-07-23 | End: 2018-10-18 | Stop reason: SDUPTHER

## 2018-10-18 ENCOUNTER — OFFICE VISIT (OUTPATIENT)
Dept: INTERNAL MEDICINE CLINIC | Age: 58
End: 2018-10-18

## 2018-10-18 VITALS
BODY MASS INDEX: 30.61 KG/M2 | WEIGHT: 226 LBS | OXYGEN SATURATION: 98 % | HEIGHT: 72 IN | HEART RATE: 58 BPM | SYSTOLIC BLOOD PRESSURE: 120 MMHG | DIASTOLIC BLOOD PRESSURE: 78 MMHG

## 2018-10-18 DIAGNOSIS — I10 HTN (HYPERTENSION), BENIGN: ICD-10-CM

## 2018-10-18 DIAGNOSIS — R51.9 CHRONIC NONINTRACTABLE HEADACHE, UNSPECIFIED HEADACHE TYPE: ICD-10-CM

## 2018-10-18 DIAGNOSIS — G89.29 CHRONIC NONINTRACTABLE HEADACHE, UNSPECIFIED HEADACHE TYPE: ICD-10-CM

## 2018-10-18 DIAGNOSIS — I63.111 CEREBROVASCULAR ACCIDENT (CVA) DUE TO EMBOLISM OF RIGHT VERTEBRAL ARTERY (HCC): Primary | ICD-10-CM

## 2018-10-18 DIAGNOSIS — I65.01 OCCLUSION AND STENOSIS OF RIGHT VERTEBRAL ARTERY: ICD-10-CM

## 2018-10-18 RX ORDER — ATORVASTATIN CALCIUM 40 MG/1
TABLET, FILM COATED ORAL
Qty: 90 TABLET | Refills: 5 | Status: SHIPPED | OUTPATIENT
Start: 2018-10-18 | End: 2019-12-26

## 2018-10-18 RX ORDER — LISINOPRIL 10 MG/1
10 TABLET ORAL DAILY
Qty: 30 TABLET | Refills: 5 | Status: SHIPPED | OUTPATIENT
Start: 2018-10-18 | End: 2018-12-12 | Stop reason: SDUPTHER

## 2018-10-18 NOTE — PROGRESS NOTES
m)   Wt 226 lb (102.5 kg)   SpO2 98%   BMI 30.65 kg/m²     Wt Readings from Last 3 Encounters:   10/18/18 226 lb (102.5 kg)   07/12/18 230 lb (104.3 kg)   05/17/18 230 lb (104.3 kg)       HEENT:  Oropharynx clear, no lymphadenopathy,   LUNGS:  Clear and without wheezes  HEART:  Regular rhythm, no appreciable murmur  ABD:  Benign, active bowel sounds  EXT:  No edema, pulses palpable  NEURO:  No focal neurologic deficits noted. ASSESSMENT / PLAN:   1.  Right vertebral artery occlusion with history of right ischemic cerebellar stroke (6/2017) - follows with Dr. Amy Durand (last seen 3/7/18) and had a follow-up CTA head and neck on 3/19/18 suggestive of worsening of near complete occlusion of the right vertebral artery.  He was recommended to follow-up at [facility] , where he was evaluated on  5/21/18 and recommended to discontinue Coumadin. Recommended serial follow-up visits at this time without any intervention planned (per patient)   - Patient currently on aspirin, statin      2. HTN - optimal control on lisinopril 20 mg daily     3 .  Right leg tingling numbness - stable on Neurontin 100 mg nightly      4.  Headache , right-sided in the setting of right vertebral artery occlusion - ESR on 3/19/18 normal ; continue NSAIDs when necessary     5.  Easy  fatigability  - New Problem reported on 5/17/18 - checked  vitamin D levels - Normal - Improved     6.   Routine health maintenance - discussed with patient regarding SCREENING colonoscopy and provided referral to Dr. Hernando Romero in 6  months      Valencia العراقي MD  Hospitalist Physician

## 2018-12-12 RX ORDER — LISINOPRIL 10 MG/1
10 TABLET ORAL DAILY
Qty: 30 TABLET | Refills: 5 | Status: SHIPPED | OUTPATIENT
Start: 2018-12-12 | End: 2019-07-19 | Stop reason: SDUPTHER

## 2019-07-22 RX ORDER — LISINOPRIL 10 MG/1
TABLET ORAL
Qty: 30 TABLET | Refills: 4 | Status: SHIPPED | OUTPATIENT
Start: 2019-07-22 | End: 2020-02-12

## 2019-12-26 RX ORDER — ATORVASTATIN CALCIUM 40 MG/1
TABLET, FILM COATED ORAL
Qty: 90 TABLET | Refills: 0 | Status: SHIPPED | OUTPATIENT
Start: 2019-12-26 | End: 2020-03-02 | Stop reason: SDUPTHER

## 2020-02-12 RX ORDER — LISINOPRIL 10 MG/1
TABLET ORAL
Qty: 30 TABLET | Refills: 3 | Status: SHIPPED | OUTPATIENT
Start: 2020-02-12 | End: 2020-03-02 | Stop reason: SDUPTHER

## 2020-03-02 ENCOUNTER — OFFICE VISIT (OUTPATIENT)
Dept: INTERNAL MEDICINE CLINIC | Age: 60
End: 2020-03-02

## 2020-03-02 VITALS
OXYGEN SATURATION: 97 % | WEIGHT: 218 LBS | SYSTOLIC BLOOD PRESSURE: 120 MMHG | HEIGHT: 72 IN | BODY MASS INDEX: 29.53 KG/M2 | HEART RATE: 61 BPM | DIASTOLIC BLOOD PRESSURE: 68 MMHG

## 2020-03-02 PROCEDURE — 99213 OFFICE O/P EST LOW 20 MIN: CPT | Performed by: INTERNAL MEDICINE

## 2020-03-02 RX ORDER — ATORVASTATIN CALCIUM 40 MG/1
TABLET, FILM COATED ORAL
Qty: 90 TABLET | Refills: 3 | Status: SHIPPED | OUTPATIENT
Start: 2020-03-02 | End: 2021-05-10

## 2020-03-02 RX ORDER — LISINOPRIL 10 MG/1
10 TABLET ORAL DAILY
Qty: 90 TABLET | Refills: 2 | Status: SHIPPED | OUTPATIENT
Start: 2020-03-02 | End: 2021-01-18

## 2020-03-02 NOTE — PROGRESS NOTES
Continue taking aspirin 325 mg daily and atorvastatin (LIpitor) 40 mg nightly. 2. Hypertension:  Blood pressure good today at 120/68, goal less than 130/80. Continue taking lisinopril 10 mg daily. 3. Cranial neuropathy:  Continues on gabapentin 100 mg nightly.        Follow-up as needed or six months  LABS:  CMP

## 2020-08-10 ENCOUNTER — OFFICE VISIT (OUTPATIENT)
Dept: INTERNAL MEDICINE CLINIC | Age: 60
End: 2020-08-10

## 2020-08-10 VITALS
BODY MASS INDEX: 29.66 KG/M2 | DIASTOLIC BLOOD PRESSURE: 60 MMHG | HEART RATE: 71 BPM | TEMPERATURE: 98.7 F | HEIGHT: 72 IN | SYSTOLIC BLOOD PRESSURE: 120 MMHG | WEIGHT: 219 LBS | OXYGEN SATURATION: 97 %

## 2020-08-10 PROCEDURE — 99214 OFFICE O/P EST MOD 30 MIN: CPT | Performed by: INTERNAL MEDICINE

## 2020-08-10 PROCEDURE — 93000 ELECTROCARDIOGRAM COMPLETE: CPT | Performed by: INTERNAL MEDICINE

## 2020-08-10 RX ORDER — CITALOPRAM 20 MG/1
20 TABLET ORAL DAILY
Qty: 30 TABLET | Refills: 5 | Status: SHIPPED
Start: 2020-08-10 | End: 2020-08-24 | Stop reason: SINTOL

## 2020-08-10 NOTE — PROGRESS NOTES
SUBJECTIVE:   Follow up form 3/2/20. Significant grief reaction following son's death in [place]. He was having chest pain intermittently that he attributes to stress. He ran out of higher dose aspirin a few months ago and has been taking 81 mg daily. Difficulty sleeping initially and now really low motivation. History of smoking and quit in 2001. History of present illness:  medical history of hypertension, right ischemic cerebellar stroke in June 2017 with no residual deficits (follows with Dr. Renuka Chambers - last seen on 3/7/18 and had CTA head and neck on 3/9/18 ), right vertebral artery occlusion for which he Follows with  last seen on 5/21/18. MEDICATIONS:  aspirin 325 MG EC tablet Take 325 mg by mouth   atorvastatin (LIPITOR) 40 MG tablet TAKE 1 TABLET BY MOUTH nightly   lisinopril (PRINIVIL;ZESTRIL) 10 MG tablet Take 1 tablet by mouth daily    Take 1 capsule by mouth nightly (quit after 30 days)       Lab Results   Component Value Date    LABA1C 5.5 10/02/2017     Lab Results   Component Value Date    WBC 9.9 11/21/2017    HGB 15.1 11/21/2017     11/21/2017    MCV 84.5 11/21/2017     Lab Results   Component Value Date     11/21/2017    K 3.4 (L) 11/21/2017    CL 98 (L) 11/21/2017    CO2 28 11/21/2017    BUN 11 03/09/2018    CREATININE 0.8 (L) 03/09/2018    GLUCOSE 155 (H) 11/21/2017     ECG:  Sinus  Rhythm. Low voltage in limb leads. Nonspecific QRS widening. Unchanged compared with prior ECG of 11/21/2017. OBJECTIVE:    /60   Pulse 71   Temp 98.7 °F (37.1 °C)   Ht 6' (1.829 m)   Wt 219 lb (99.3 kg)   SpO2 97%   BMI 29.70 kg/m²   HEENT:  Oropharynx clear, no lymphadenopathy,   LUNGS:  Clear and without wheezes  HEART:  Regular rhythm, no appreciable murmur  ABD:  Benign, active bowel sounds  EXT:  No edema, pulses palpable  NEURO:  No focal neurologic deficits noted. ASSESSMENT / PLAN:   1.  Right vertebral artery occlusion with history of right ischemic cerebellar stroke (June 2017): Start back taking aspirin 325 mg daily and atorvastatin (LIpitor) 40 mg nightly. 2. Hypertension:  Blood pressure good today at 120/60, goal less than 130/80. Continue taking lisinopril 10 mg daily. 3. Serotonin depletion:  Start taking citalopram (Celexa) 10 mg (half tablet) every evening for a week, then 20 mg (whole tablet) every evening. 4. Chest pain:  ECG today is unchanged from prior ECG of 11/21/2017. Likely related to stress and anxiety. Discussed need for nuclear stress test but patient declines due to lack of insurance.         Follow-up in 2 weeks

## 2020-08-10 NOTE — PATIENT INSTRUCTIONS
1. Right vertebral artery occlusion with history of right ischemic cerebellar stroke (June 2017): Start back taking aspirin 325 mg daily and atorvastatin (LIpitor) 40 mg nightly. 2. Hypertension:  Blood pressure good today at 120/60, goal less than 130/80. Continue taking lisinopril 10 mg daily. 3. Serotonin depletion:  Start taking citalopram (Celexa) 10 mg (half tablet) every evening for a week, then 20 mg (whole tablet) every evening. 4. Chest pain:  We will check an ECG today.         Follow-up in 2 weeks

## 2020-08-11 ENCOUNTER — TELEPHONE (OUTPATIENT)
Dept: INTERNAL MEDICINE CLINIC | Age: 60
End: 2020-08-11

## 2020-08-12 ENCOUNTER — COMMUNITY OUTREACH (OUTPATIENT)
Dept: OTHER | Age: 60
End: 2020-08-12

## 2020-08-12 NOTE — PROGRESS NOTES
GHADA-RANDAL spoke with patient on this date and advised that SW was following up due to referral from [facility]. Patient currently at work and requested that SW contact before 9AM tomorrow.

## 2020-08-13 ENCOUNTER — COMMUNITY OUTREACH (OUTPATIENT)
Dept: OTHER | Age: 60
End: 2020-08-13

## 2020-08-24 ENCOUNTER — OFFICE VISIT (OUTPATIENT)
Dept: INTERNAL MEDICINE CLINIC | Age: 60
End: 2020-08-24

## 2020-08-24 VITALS
DIASTOLIC BLOOD PRESSURE: 60 MMHG | HEIGHT: 72 IN | HEART RATE: 76 BPM | WEIGHT: 219 LBS | TEMPERATURE: 98 F | SYSTOLIC BLOOD PRESSURE: 100 MMHG | BODY MASS INDEX: 29.66 KG/M2 | OXYGEN SATURATION: 98 %

## 2020-08-24 PROCEDURE — 99214 OFFICE O/P EST MOD 30 MIN: CPT | Performed by: INTERNAL MEDICINE

## 2020-08-24 RX ORDER — MIRTAZAPINE 30 MG/1
30 TABLET, FILM COATED ORAL NIGHTLY
Qty: 30 TABLET | Refills: 5 | Status: SHIPPED | OUTPATIENT
Start: 2020-08-24 | End: 2021-11-03 | Stop reason: ALTCHOICE

## 2020-08-24 RX ORDER — GABAPENTIN 100 MG/1
100 CAPSULE ORAL NIGHTLY
Qty: 90 CAPSULE | Refills: 5 | Status: CANCELLED | OUTPATIENT
Start: 2020-08-24 | End: 2022-02-15

## 2020-08-24 NOTE — PROGRESS NOTES
1. Right vertebral artery occlusion with history of right ischemic cerebellar stroke (June 2017):  Continue taking aspirin 325 mg daily and atorvastatin (LIpitor) 40 mg nightly.    2. Hypertension:  Blood pressure low today at 100/60, goal less than 130/80.  Continue taking lisinopril 10 mg daily.    3. Serotonin depletion and insomnia:  START taking mirtazapine (Remeron) 30 mg every evening. Also take melatonin 5 mg every night at bedtime. 4. Comprehensive metabolic panel from August 10 is NORMAL.        Follow-up in three weeks

## 2021-01-18 RX ORDER — LISINOPRIL 10 MG/1
TABLET ORAL
Qty: 90 TABLET | Refills: 1 | Status: SHIPPED | OUTPATIENT
Start: 2021-01-18 | End: 2021-07-23

## 2021-05-10 RX ORDER — ATORVASTATIN CALCIUM 40 MG/1
TABLET, FILM COATED ORAL
Qty: 90 TABLET | Refills: 2 | Status: SHIPPED | OUTPATIENT
Start: 2021-05-10 | End: 2022-02-21

## 2021-07-23 RX ORDER — LISINOPRIL 10 MG/1
TABLET ORAL
Qty: 90 TABLET | Refills: 0 | Status: SHIPPED | OUTPATIENT
Start: 2021-07-23 | End: 2021-10-19

## 2021-09-14 ENCOUNTER — APPOINTMENT (OUTPATIENT)
Dept: GENERAL RADIOLOGY | Age: 61
End: 2021-09-14

## 2021-09-14 ENCOUNTER — HOSPITAL ENCOUNTER (EMERGENCY)
Age: 61
Discharge: HOME | End: 2021-09-14
Attending: STUDENT IN AN ORGANIZED HEALTH CARE EDUCATION/TRAINING PROGRAM

## 2021-09-14 VITALS
DIASTOLIC BLOOD PRESSURE: 77 MMHG | OXYGEN SATURATION: 98 % | HEART RATE: 57 BPM | SYSTOLIC BLOOD PRESSURE: 122 MMHG | BODY MASS INDEX: 31.15 KG/M2 | WEIGHT: 230 LBS | HEIGHT: 72 IN | TEMPERATURE: 97.7 F | RESPIRATION RATE: 16 BRPM

## 2021-09-14 DIAGNOSIS — R06.02 SHORTNESS OF BREATH: Primary | ICD-10-CM

## 2021-09-14 LAB
A/G RATIO: 1.2 (ref 1.1–2.2)
ALBUMIN SERPL-MCNC: 4.6 G/DL (ref 3.4–5)
ALP BLD-CCNC: 100 U/L (ref 40–129)
ALT SERPL-CCNC: 34 U/L (ref 10–40)
ANION GAP SERPL CALCULATED.3IONS-SCNC: 10 MMOL/L (ref 3–16)
AST SERPL-CCNC: 21 U/L (ref 15–37)
BASOPHILS ABSOLUTE: 0 K/UL (ref 0–0.2)
BASOPHILS RELATIVE PERCENT: 0.6 %
BILIRUB SERPL-MCNC: 0.6 MG/DL (ref 0–1)
BILIRUBIN URINE: NEGATIVE
BLOOD, URINE: ABNORMAL
BUN BLDV-MCNC: 12 MG/DL (ref 7–20)
CALCIUM SERPL-MCNC: 9.9 MG/DL (ref 8.3–10.6)
CHLORIDE BLD-SCNC: 103 MMOL/L (ref 99–110)
CLARITY: CLEAR
CO2: 26 MMOL/L (ref 21–32)
COLOR: YELLOW
CREAT SERPL-MCNC: 0.8 MG/DL (ref 0.8–1.3)
EKG ATRIAL RATE: 59 BPM
EKG DIAGNOSIS: NORMAL
EKG P AXIS: 33 DEGREES
EKG P-R INTERVAL: 166 MS
EKG Q-T INTERVAL: 412 MS
EKG QRS DURATION: 104 MS
EKG QTC CALCULATION (BAZETT): 407 MS
EKG R AXIS: 5 DEGREES
EKG T AXIS: 31 DEGREES
EKG VENTRICULAR RATE: 59 BPM
EOSINOPHILS ABSOLUTE: 0.1 K/UL (ref 0–0.6)
EOSINOPHILS RELATIVE PERCENT: 1.7 %
GFR AFRICAN AMERICAN: >60
GFR NON-AFRICAN AMERICAN: >60
GLOBULIN: 3.7 G/DL
GLUCOSE BLD-MCNC: 98 MG/DL (ref 70–99)
GLUCOSE URINE: NEGATIVE MG/DL
HCT VFR BLD CALC: 45.3 % (ref 40.5–52.5)
HEMOGLOBIN: 15.8 G/DL (ref 13.5–17.5)
KETONES, URINE: NEGATIVE MG/DL
LEUKOCYTE ESTERASE, URINE: NEGATIVE
LIPASE: 16 U/L (ref 13–60)
LYMPHOCYTES ABSOLUTE: 1.5 K/UL (ref 1–5.1)
LYMPHOCYTES RELATIVE PERCENT: 18.6 %
MCH RBC QN AUTO: 29.8 PG (ref 26–34)
MCHC RBC AUTO-ENTMCNC: 34.8 G/DL (ref 31–36)
MCV RBC AUTO: 85.6 FL (ref 80–100)
MICROSCOPIC EXAMINATION: YES
MONOCYTES ABSOLUTE: 0.6 K/UL (ref 0–1.3)
MONOCYTES RELATIVE PERCENT: 7.7 %
NEUTROPHILS ABSOLUTE: 5.8 K/UL (ref 1.7–7.7)
NEUTROPHILS RELATIVE PERCENT: 71.4 %
NITRITE, URINE: NEGATIVE
PDW BLD-RTO: 14.4 % (ref 12.4–15.4)
PH UA: 5.5 (ref 5–8)
PLATELET # BLD: 212 K/UL (ref 135–450)
PMV BLD AUTO: 7.9 FL (ref 5–10.5)
POTASSIUM REFLEX MAGNESIUM: 4.1 MMOL/L (ref 3.5–5.1)
PROTEIN UA: NEGATIVE MG/DL
RBC # BLD: 5.3 M/UL (ref 4.2–5.9)
RBC UA: NORMAL /HPF (ref 0–4)
SARS-COV-2: NOT DETECTED
SODIUM BLD-SCNC: 139 MMOL/L (ref 136–145)
SPECIFIC GRAVITY UA: 1.02 (ref 1–1.03)
TOTAL PROTEIN: 8.3 G/DL (ref 6.4–8.2)
TROPONIN: <0.01 NG/ML
URINE TYPE: ABNORMAL
UROBILINOGEN, URINE: 0.2 E.U./DL
WBC # BLD: 8.1 K/UL (ref 4–11)
WBC UA: NORMAL /HPF (ref 0–5)

## 2021-09-14 PROCEDURE — 81001 URINALYSIS AUTO W/SCOPE: CPT

## 2021-09-14 PROCEDURE — 83690 ASSAY OF LIPASE: CPT

## 2021-09-14 PROCEDURE — 99284 EMERGENCY DEPT VISIT MOD MDM: CPT

## 2021-09-14 PROCEDURE — 71045 X-RAY EXAM CHEST 1 VIEW: CPT

## 2021-09-14 PROCEDURE — 84484 ASSAY OF TROPONIN QUANT: CPT

## 2021-09-14 PROCEDURE — 93005 ELECTROCARDIOGRAM TRACING: CPT | Performed by: PHYSICIAN ASSISTANT

## 2021-09-14 PROCEDURE — 93010 ELECTROCARDIOGRAM REPORT: CPT | Performed by: INTERNAL MEDICINE

## 2021-09-14 PROCEDURE — U0005 INFEC AGEN DETEC AMPLI PROBE: HCPCS

## 2021-09-14 PROCEDURE — 85025 COMPLETE CBC W/AUTO DIFF WBC: CPT

## 2021-09-14 PROCEDURE — U0003 INFECTIOUS AGENT DETECTION BY NUCLEIC ACID (DNA OR RNA); SEVERE ACUTE RESPIRATORY SYNDROME CORONAVIRUS 2 (SARS-COV-2) (CORONAVIRUS DISEASE [COVID-19]), AMPLIFIED PROBE TECHNIQUE, MAKING USE OF HIGH THROUGHPUT TECHNOLOGIES AS DESCRIBED BY CMS-2020-01-R: HCPCS

## 2021-09-14 PROCEDURE — 80053 COMPREHEN METABOLIC PANEL: CPT

## 2021-09-14 RX ORDER — ALBUTEROL SULFATE 90 UG/1
2 AEROSOL, METERED RESPIRATORY (INHALATION) 4 TIMES DAILY PRN
Qty: 18 G | Refills: 0 | Status: SHIPPED | OUTPATIENT
Start: 2021-09-14

## 2021-09-14 ASSESSMENT — ENCOUNTER SYMPTOMS
COUGH: 1
SORE THROAT: 0
ABDOMINAL PAIN: 1
CONSTIPATION: 0
VOMITING: 0
DIARRHEA: 0
NAUSEA: 0
SHORTNESS OF BREATH: 1
BACK PAIN: 0
CHEST TIGHTNESS: 0

## 2021-09-14 ASSESSMENT — PAIN SCALES - GENERAL: PAINLEVEL_OUTOF10: 4

## 2021-09-14 NOTE — ED NOTES
Ambulated pt around ED on room air. Pt's O2 sat 97-99% with HR 66-82. Pt tolerated well.       Nikolay Altamirano  09/14/21 1125

## 2021-09-14 NOTE — ED PROVIDER NOTES
right-sided abdominal pain that is intermittent in nature. He denies abdominal pain at this time, but states he will have intermittent pains on the right side of his abdomen that are not associated with food or movement. He does report taking his wife's albuterol inhaler this morning with some mild symptomatic relief. He is a previous smoker with no diagnosis of COPD or emphysema. PastMedical/Surgical History:      Diagnosis Date    Cerebral artery occlusion with cerebral infarction ([address] 05/2017    Hypertension     past hx         Procedure Laterality Date    HERNIA REPAIR  69/86/4524    umbilical    WISDOM TOOTH EXTRACTION         Medications:  Discharge Medication List as of 9/14/2021 12:49 PM      CONTINUE these medications which have NOT CHANGED    Details   lisinopril (PRINIVIL;ZESTRIL) 10 MG tablet TAKE ONE TABLET BY MOUTH DAILY, Disp-90 tablet, R-0Normal      atorvastatin (LIPITOR) 40 MG tablet TAKE ONE TABLET BY MOUTH ONCE NIGHTLY, Disp-90 tablet, R-2Normal      aspirin 325 MG EC tablet Take 1 tablet by mouth daily, Disp-30 tablet,R-5Normal      mirtazapine (REMERON) 30 MG tablet Take 1 tablet by mouth nightly, Disp-30 tablet,R-5Normal               Review of Systems:  (2-9 systems needed)  Review of Systems   Constitutional: Negative for chills and fever. HENT: Negative for congestion and sore throat. Eyes: Negative for visual disturbance. Respiratory: Positive for cough (chronic) and shortness of breath. Negative for chest tightness. Cardiovascular: Negative for chest pain, palpitations and leg swelling. Gastrointestinal: Positive for abdominal pain. Negative for constipation, diarrhea, nausea and vomiting. Genitourinary: Negative for dysuria, frequency, hematuria and urgency. Musculoskeletal: Negative for back pain. Skin: Negative for rash. Neurological: Positive for headaches (4/10 HA while in ED). Negative for dizziness, weakness and light-headedness.        Physical Exam:  Physical Exam  Vitals and nursing note reviewed. Constitutional:       Appearance: Normal appearance. He is not diaphoretic. HENT:      Head: Normocephalic and atraumatic. Nose: Nose normal.   Eyes:      General:         Right eye: No discharge. Left eye: No discharge. Cardiovascular:      Rate and Rhythm: Normal rate and regular rhythm. Pulses: Normal pulses. Heart sounds: Normal heart sounds. No murmur heard. No friction rub. No gallop. Pulmonary:      Effort: Pulmonary effort is normal. No tachypnea, accessory muscle usage or respiratory distress. Breath sounds: Normal breath sounds. No stridor. No wheezing, rhonchi or rales. Chest:      Chest wall: No mass or tenderness. Abdominal:      Palpations: Abdomen is soft. There is no mass. Tenderness: There is no abdominal tenderness. There is no guarding or rebound. Musculoskeletal:         General: Normal range of motion. Cervical back: Normal range of motion and neck supple. Right lower leg: No tenderness. No edema. Left lower leg: No tenderness. No edema. Skin:     General: Skin is warm and dry. Coloration: Skin is not pale. Neurological:      Mental Status: He is alert and oriented to person, place, and time.    Psychiatric:         Mood and Affect: Mood normal.         Behavior: Behavior normal.         MEDICAL DECISION MAKING    Vitals:    Vitals:    09/14/21 0843 09/14/21 1029 09/14/21 1141 09/14/21 1242   BP: 134/77 (!) 149/87  122/77   Pulse: 61 64 56 57   Resp: 16 11 15 16   Temp: 97.7 °F (36.5 °C)      TempSrc: Oral      SpO2: 100% 100% 98% 98%   Weight: 230 lb (104.3 kg)      Height: 6' (1.829 m)          LABS:  Labs Reviewed   COMPREHENSIVE METABOLIC PANEL W/ REFLEX TO MG FOR LOW K - Abnormal; Notable for the following components:       Result Value    Total Protein 8.3 (*)     All other components within normal limits    Narrative:     Performed at:  [address] [place] [address] [address] [facility], [facility]   Phone [phone]   URINALYSIS - Abnormal; Notable for the following components:    Blood, Urine TRACE-INTACT (*)     All other components within normal limits    Narrative:     Performed at:  [address], [facility]   Phone [phone]   CBC WITH AUTO DIFFERENTIAL    Narrative:     Performed at:  [place] [address] [facility]   Phone [phone]   TROPONIN    Narrative:     Performed at:  [place] [address] [facility]   Phone [phone]    Narrative:     Performed at:  [facility]  [address] [place] [address]   Phone [phone]   MICROSCOPIC URINALYSIS    Narrative:     Performed at:  [address], [facility]   Phone [phone]   LIPASE    Narrative:     Performed at:  [address], [facility]   Phone  of labs reviewed and were negative at this time or not returned at the time of this note. RADIOLOGY:   Non-plain film images such as CT, Ultrasound and MRI are read by the radiologist. EDMOND Gross have directly visualized the radiologic plain film image(s) with the below findings:      Interpretation per the Radiologist below, if available at the time of this note:    XR CHEST PORTABLE   Final Result   Clear lungs.               XR CHEST PORTABLE    Result Date: 9/14/2021  EXAMINATION: ONE XRAY VIEW OF THE CHEST 9/14/2021 10:30 am COMPARISON: Chest x-ray dated 11/21/2017 HISTORY: ORDERING SYSTEM PROVIDED HISTORY: sob TECHNOLOGIST PROVIDED HISTORY: Reason for exam:->sob Reason for Exam: SOB, headache Acuity: Acute Type of Exam: Initial FINDINGS: Clear lungs. No pleural effusion or pneumothorax. Cardiomediastinal silhouette is unremarkable. Visualized osseous structures are unremarkable. Clear lungs. MEDICAL DECISION MAKING / ED COURSE:      Patient was seen and evaluated in the emergency department by myself and attending physician. All diagnostic, treatment, and disposition decisions made in conjunction with attending physician. Patient was given:  Medications - No data to display    Patient presents to the emergency department today for evaluation of shortness of breath that has been going on for several weeks. Vital signs are stable, he is satting at 100% on room air. He is ambulated by nursing staff without drop in O2 sats. Physical exam is grossly benign, the patient does complain of intermittent abdominal pain but it is not reproducible. EKG interpreted by attending physician found to have sinus bradycardia  Chest x-ray without acute cardiopulmonary process   initial troponin negative  CBC without evidence of leukocytosis or acute anemia  CMP with maintained renal function, no evidence of acute electrolyte abnormality. Lipase unremarkable  Urinalysis without evidence of infection. Chest x-ray without evidence of acute cardiopulmonary process  Troponin negative. Orthostatics were performed by nursing staff. Found to be borderline positive with slightly elevated heart rate this evening. Patient given p.o. fluid. He is hemodynamically stable and feels asymptomatic at this time. He was evaluated by attending physician, after reviewing emergency department work-up patient is deemed appropriate for discharge. He will be discharged at this time. The patient tolerated their visit well. I evaluated the patient. The physician was available for consultation as needed. The patient and / or the family were informed of the results of any tests, a time was given to answer questions, a plan was proposed and they agreed with plan. Results for orders placed or performed during the hospital encounter of 09/14/21   CBC auto differential   Result Value Ref Range    WBC 8.1 4.0 - 11.0 K/uL    RBC 5.30 4.20 - 5.90 M/uL    Hemoglobin 15.8 13.5 - 17.5 g/dL    Hematocrit 45.3 40.5 - 52.5 %    MCV 85.6 80.0 - 100.0 fL    MCH 29.8 26.0 - 34.0 pg    MCHC 34.8 31.0 - 36.0 g/dL    RDW 14.4 12.4 - 15.4 %    Platelets 363 821 - 181 K/uL    MPV 7.9 5.0 - 10.5 fL    Neutrophils % 71.4 %    Lymphocytes % 18.6 %    Monocytes % 7.7 %    Eosinophils % 1.7 %    Basophils % 0.6 %    Neutrophils Absolute 5.8 1.7 - 7.7 K/uL    Lymphocytes Absolute 1.5 1.0 - 5.1 K/uL    Monocytes Absolute 0.6 0.0 - 1.3 K/uL    Eosinophils Absolute 0.1 0.0 - 0.6 K/uL    Basophils Absolute 0.0 0.0 - 0.2 K/uL   Comprehensive Metabolic Panel w/ Reflex to MG   Result Value Ref Range    Sodium 139 136 - 145 mmol/L    Potassium reflex Magnesium 4.1 3.5 - 5.1 mmol/L    Chloride 103 99 - 110 mmol/L    CO2 26 21 - 32 mmol/L    Anion Gap 10 3 - 16    Glucose 98 70 - 99 mg/dL    BUN 12 7 - 20 mg/dL    CREATININE 0.8 0.8 - 1.3 mg/dL    GFR Non-African American >60 >60    GFR African American >60 >60    Calcium 9.9 8.3 - 10.6 mg/dL    Total Protein 8.3 (H) 6.4 - 8.2 g/dL    Albumin 4.6 3.4 - 5.0 g/dL    Albumin/Globulin Ratio 1.2 1.1 - 2.2    Total Bilirubin 0.6 0.0 - 1.0 mg/dL    Alkaline Phosphatase 100 40 - 129 U/L    ALT 34 10 - 40 U/L    AST 21 15 - 37 U/L    Globulin 3.7 g/dL   Troponin   Result Value Ref Range    Troponin <0.01 <0.01 ng/mL   COVID-19   Result Value Ref Range    SARS-CoV-2 Not Detected Not detected   Urinalysis, reflex to microscopic   Result Value Ref Range    Color, UA Yellow Straw/Yellow    Clarity, UA Clear Clear    Glucose, Ur Negative Negative mg/dL    Bilirubin Urine Negative Negative    Ketones, Urine Negative Negative mg/dL    Specific Gravity, UA 1.025 1.005 - 1.030    Blood, Urine TRACE-INTACT (A) Negative    pH, UA 5.5 5.0 - 8.0    Protein, UA Negative Negative mg/dL    Urobilinogen, Urine 0.2 <2.0 E.U./dL    Nitrite, Urine Negative Negative    Leukocyte Esterase, Urine Negative Negative    Microscopic Examination YES     Urine Type NotGiven    Microscopic Urinalysis   Result Value Ref Range    WBC, UA None seen 0 - 5 /HPF    RBC, UA 0-2 0 - 4 /HPF   Lipase   Result Value Ref Range    Lipase 16.0 13.0 - 60.0 U/L   EKG 12 Lead   Result Value Ref Range    Ventricular Rate 59 BPM    Atrial Rate 59 BPM    P-R Interval 166 ms    QRS Duration 104 ms    Q-T Interval 412 ms    QTc Calculation (Bazett) 407 ms    P Axis 33 degrees    R Axis 5 degrees    T Axis 31 degrees    Diagnosis       Sinus bradycardiaOtherwise normal ECGWhen compared with ECG of 21-NOV-2017 20:54,No significant change was foundConfirmed by EDUARDO Cherry MD ([id number]) on 9/14/2021 2:25:19 PM       I estimate there is LOW risk for ACUTE CORONARY SYNDROME, INTRACRANIAL HEMORRHAGE, MALIGNANT DYSRHYTHMIA, MENINGITIS, PNEUMONIA, PULMONARY EMBOLISM, SEPSIS, SUBARACHNOID HEMORRHAGE, SUBDURAL HEMATOMA, STROKE, or URINARY TRACT INFECTION, thus I consider the discharge disposition reasonable. Isidra Gallego and I have discussed the diagnosis and risks, and we agree with discharging home to follow-up with their primary doctor. We also discussed returning to the Emergency Department immediately if new or worsening symptoms occur. We have discussed the symptoms which are most concerning (e.g., changing or worsening pain, weakness, vomiting, fever) that necessitate immediate return. Final Impression    1. Shortness of breath        Blood pressure 122/77, pulse 57, temperature 97.7 °F (36.5 °C), temperature source Oral, resp. rate 16, height 6' (1.829 m), weight 230 lb (104.3 kg), SpO2 98 %. CLINICAL IMPRESSION:  1.  Shortness of breath        DISPOSITION        PATIENT REFERRED TO:  Adrian Khanna MD  [address] [place] [address] [address] [facility] [address]  [phone]    Schedule an appointment as soon as possible for a visit [facility]  ED  [address] [facility] [address] [phone]  Go to   If symptoms worsen      DISCHARGE MEDICATIONS:  Discharge Medication List as of 9/14/2021 12:49 PM      START taking these medications    Details   albuterol sulfate HFA (VENTOLIN HFA) 108 (90 Base) MCG/ACT inhaler Inhale 2 puffs into the lungs 4 times daily as needed for Wheezing, Disp-18 g, R-0Normal             DISCONTINUED MEDICATIONS:  Discharge Medication List as of 9/14/2021 12:49 PM                 (Please note the MDM and HPI sections of this note were completed with a voice recognition program.  Efforts were made to edit the dictations but occasionally words are mis-transcribed.)    Electronically signed, David Pickard,           David Pickard  09/15/21 1820

## 2021-09-15 NOTE — ED PROVIDER NOTES
I independently examined and evaluated Claudette Said. In brief, Claudette Said is a 57 y.o. male with a past medical history of CVA, hypertension, hyperlipidemia, who presents to the ED complaining of shortness of breath. Patient reports symptoms for several weeks. He has had rapid coronavirus testing that was negative. Patient does report chronic cough, nonproductive, unchanged from baseline. He denies any chest pain. He states that he has been checking his pulse ox at home and was found to be below 90%. He denies fever, lightheadedness. He did use his wife's albuterol inhaler with some improvement of symptoms. REVIEW OF SYSTEMS  All systems reviewed, pertinent positives per HPI otherwise noted to be negative. Focused exam revealed   PHYSICAL EXAM  /77   Pulse 57   Temp 97.7 °F (36.5 °C) (Oral)   Resp 16   Ht 6' (1.829 m)   Wt 230 lb (104.3 kg)   SpO2 98%   BMI 31.19 kg/m²    GENERAL APPEARANCE: Awake and alert. Cooperative. no distress. HENT: Normocephalic. Atraumatic. Mucous membranes are moist  NECK: Supple. Full range of motion of the neck without stiffness or pain. EYES: PERRL. EOM's grossly intact. HEART/CHEST: RRR. No murmurs. Chest wall is not tender to palpation. LUNGS: Respirations unlabored. CTAB. Good air exchange. Speaking comfortably in full sentences. ABDOMEN: No tenderness. Soft. Non-distended. No masses. No organomegaly. No guarding or rebound. MUSCULOSKELETAL: No extremity edema. Compartments soft. No deformity. No tenderness in the extremities. All extremities neurovascularly intact. SKIN: Warm and dry. No acute rashes. NEUROLOGICAL: Alert and oriented. No gross facial drooping. Strength 5/5, sensation intact. PSYCHIATRIC: Normal mood and affect. ED course / MDM:   Overall well appearing patient, in no acute distress, presenting for shortness of breath. Physical exam unremarkable.      Differential diagnosis includes but is not limited to: Pneumonia, pneumothorax, pleural effusion, ACS, CHF exacerbation, COPD exacerbation, asthma, pulmonary embolism, arrhythmia, anemia    At this time I have low concern for pulmonary embolism. Patient has not had a previous blood clot. Patient denies other risk factors for pulmonary embolism. Patient does not have any evidence of DVT on exam.  Patient is low risk on PERC and Wells criteria. At this time, considering that risks associated with further work-up for pulmonary embolism outweigh the likelihood of this diagnosis. The Ekg interpreted by me shows  sinus bradycardia, rate=59   Axis is   Normal  QTc is  within an acceptable range  Intervals and Durations are unremarkable. ST Segments: no acute change  No significant change from prior EKG dated 8/10/20    EKG showed no evidence of acute ischemia. Troponin was within normal limits. Patient has had these symptoms chronically, he denies chest pain, overall low suspicion for ACS    Chest x-ray showed no evidence of pneumonia, pleural effusion, or pneumothorax. No significant electrolyte abnormalities or evidence of kidney dysfunction. No leukocytosis, anemia, or thrombocytopenia. Patient ambulated with oxygen saturation 97-99% on room air. He has not had any episodes of hypoxia in the emergency department. He does not appear to be short of breath on exam.    Patient reports his symptoms improved with inhaler. It is possible that he has reactive airway disease. Given his history of smoking, as possible that he could have COPD. Patient given referral to pulmonology and also given a prescription for an inhaler. Work-up overall reassuring. At this time, feel the patient is appropriate for discharge to follow-up with a primary care doctor. Patient feels comfortable with discharge at this time. Patient was provided with prescriptions for albuterol inhaler. Return precautions given.   Encouraged PCP follow-up in 2 days, patient given referral to pulmonology. Patient discharged in stable condition. I estimate there is LOW risk for ACUTE CORONARY SYNDROME, PULMONARY EMBOLISM, PNEUMOTHORAX, RUPTURED ESOPHAGUS OR THORACIC AORTIC DISSECTION, thus I consider the discharge disposition reasonable. Rebbecca Halsted and I have discussed the diagnosis and risks, and we agree with discharging home with close follow-up. We also discussed returning to the Emergency Department immediately if new or worsening symptoms occur. We have discussed the symptoms which are most concerning that necessitate immediate return. CLINICAL IMPRESSION  1. Shortness of breath        Blood pressure 122/77, pulse 57, temperature 97.7 °F (36.5 °C), temperature source Oral, resp. rate 16, height 6' (1.829 m), weight 230 lb (104.3 kg), SpO2 98 %. DISPOSITION  Rebbecca Halsted was discharged to home in stable condition. All diagnostic, treatment, and disposition decisions were made by myself in conjunction with the advanced practice provider. For all further details of the patient's emergency department visit, please see the advanced practice provider's documentation. Comment: Please note this report has been produced using speech recognition software and may contain errors related to that system including errors in grammar, punctuation, and spelling, as well as words and phrases that may be inappropriate. If there are any questions or concerns please feel free to contact the dictating provider for clarification.         Sajan Washburn MD  09/18/21 [id number]

## 2021-10-20 RX ORDER — LISINOPRIL 10 MG/1
TABLET ORAL
Qty: 30 TABLET | Refills: 90 | Status: SHIPPED | OUTPATIENT
Start: 2021-10-20 | End: 2022-10-26

## 2021-11-03 ENCOUNTER — HOSPITAL ENCOUNTER (OUTPATIENT)
Age: 61
Discharge: HOME | End: 2021-11-03

## 2021-11-03 ENCOUNTER — OFFICE VISIT (OUTPATIENT)
Dept: INTERNAL MEDICINE CLINIC | Age: 61
End: 2021-11-03

## 2021-11-03 VITALS
OXYGEN SATURATION: 99 % | SYSTOLIC BLOOD PRESSURE: 100 MMHG | HEIGHT: 72 IN | BODY MASS INDEX: 31.15 KG/M2 | WEIGHT: 230 LBS | HEART RATE: 63 BPM | DIASTOLIC BLOOD PRESSURE: 60 MMHG

## 2021-11-03 DIAGNOSIS — E78.5 DYSLIPIDEMIA: Primary | ICD-10-CM

## 2021-11-03 DIAGNOSIS — E78.5 DYSLIPIDEMIA: ICD-10-CM

## 2021-11-03 DIAGNOSIS — G45.0 VERTEBRAL ARTERY SYNDROME: ICD-10-CM

## 2021-11-03 DIAGNOSIS — I10 HTN (HYPERTENSION), BENIGN: ICD-10-CM

## 2021-11-03 LAB
CHOLESTEROL, TOTAL: 128 MG/DL (ref 0–199)
HDLC SERPL-MCNC: 42 MG/DL (ref 40–60)
LDL CHOLESTEROL CALCULATED: 59 MG/DL
TRIGL SERPL-MCNC: 137 MG/DL (ref 0–150)
VLDLC SERPL CALC-MCNC: 27 MG/DL

## 2021-11-03 PROCEDURE — 36415 COLL VENOUS BLD VENIPUNCTURE: CPT

## 2021-11-03 PROCEDURE — 99213 OFFICE O/P EST LOW 20 MIN: CPT | Performed by: INTERNAL MEDICINE

## 2021-11-03 PROCEDURE — 80061 LIPID PANEL: CPT

## 2021-11-03 ASSESSMENT — PATIENT HEALTH QUESTIONNAIRE - PHQ9
2. FEELING DOWN, DEPRESSED OR HOPELESS: 0
1. LITTLE INTEREST OR PLEASURE IN DOING THINGS: 0
SUM OF ALL RESPONSES TO PHQ9 QUESTIONS 1 & 2: 0
SUM OF ALL RESPONSES TO PHQ QUESTIONS 1-9: 0

## 2021-11-03 ASSESSMENT — ENCOUNTER SYMPTOMS: COUGH: 1

## 2021-11-03 NOTE — PROGRESS NOTES
Frankey Combe (:  1960) is a 57 y.o. male,, here for evaluation of the following chief complaint(s):  Hypertension and Insomnia (states gabapentin 100 mg helped )         ASSESSMENT/PLAN:  1. Dyslipidemia  -     LIPID PANEL; Future  2. HTN (hypertension), benign  3. Vertebral artery syndrome  1. Hyperlipidemia patient was given order for lipid panel. 2.  Hypertension continue present medicines blood pressure is well controlled. I did discuss the possibility of stopping lisinopril due to the cough and he states that is been tried in the past with poor success due to side effects of other medicines. 3.  History of previous CVA no recurrent symptoms  4. Shortness of breath question of etiology it may be related to obstructive sleep apnea. He was a long-term smoker and has tried an albuterol inhaler with intermittent success. He also mentioned around the time he had the episode of shortness of breath he felt like he had a viral syndrome. Covid test was negative at the hospital.  Symptoms seem to be slowly improving at this time would observe but if any symptoms are progressive he will call for reevaluation    Return in about 6 months (around 5/3/2022). Subjective   SUBJECTIVE/OBJECTIVE:  HPI patient in for follow-up of previous CVA and hypertension. He also has a history of hyperlipidemia and is long overdue for follow-up lipid panel. He most recently was seen in the emergency room on  for shortness of breath. He states he awakened short of breath and this persisted for 4 to 6 hours before going to the emergency room. Work-up there was totally unrevealing. At home and oximeter suggested his oxygen was in the 80s but in the ER it was all normal in the high 90s. Since that time he has had some mild shortness of breath mainly at nighttime. He denies snoring but previously has been suggested to have obstructive sleep apnea.     Review of Systems   Respiratory: Positive for cough.         Long-term infrequent cough possibly due to lisinopril   Cardiovascular: Negative for chest pain and palpitations. All other systems reviewed and are negative. Objective   Physical Exam  Vitals reviewed. Constitutional:       Appearance: Normal appearance. Neck:      Vascular: No carotid bruit. Cardiovascular:      Rate and Rhythm: Normal rate and regular rhythm. Pulses: Normal pulses. Heart sounds: Normal heart sounds. No murmur heard. Pulmonary:      Effort: Pulmonary effort is normal.      Breath sounds: Normal breath sounds. Abdominal:      General: Abdomen is flat. Palpations: Abdomen is soft. Musculoskeletal:         General: No swelling. Cervical back: Normal range of motion and neck supple. Neurological:      Mental Status: He is alert. An electronic signature was used to authenticate this note.     --Alisa Anderson MD

## 2022-02-21 RX ORDER — ATORVASTATIN CALCIUM 40 MG/1
TABLET, FILM COATED ORAL
Qty: 90 TABLET | Refills: 3 | Status: SHIPPED | OUTPATIENT
Start: 2022-02-21

## 2022-02-21 NOTE — TELEPHONE ENCOUNTER
Requested Prescriptions     Pending Prescriptions Disp Refills    atorvastatin (LIPITOR) 40 MG tablet [Pharmacy Med Name: ATORVASTATIN 40 MG TABLET] 90 tablet 2     Sig: TAKE ONE TABLET BY MOUTH ONCE NIGHTLY

## 2022-05-11 ENCOUNTER — OFFICE VISIT (OUTPATIENT)
Dept: INTERNAL MEDICINE CLINIC | Age: 62
End: 2022-05-11

## 2022-05-11 VITALS
SYSTOLIC BLOOD PRESSURE: 130 MMHG | BODY MASS INDEX: 29.8 KG/M2 | WEIGHT: 220 LBS | OXYGEN SATURATION: 96 % | HEART RATE: 70 BPM | DIASTOLIC BLOOD PRESSURE: 80 MMHG | HEIGHT: 72 IN

## 2022-05-11 DIAGNOSIS — I10 HTN (HYPERTENSION), BENIGN: ICD-10-CM

## 2022-05-11 DIAGNOSIS — E78.5 DYSLIPIDEMIA: Primary | ICD-10-CM

## 2022-05-11 PROCEDURE — 99214 OFFICE O/P EST MOD 30 MIN: CPT | Performed by: INTERNAL MEDICINE

## 2022-05-11 RX ORDER — SILDENAFIL 50 MG/1
50 TABLET, FILM COATED ORAL PRN
Qty: 30 TABLET | Refills: 3 | Status: SHIPPED | OUTPATIENT
Start: 2022-05-11

## 2022-05-11 ASSESSMENT — ENCOUNTER SYMPTOMS
SHORTNESS OF BREATH: 0
COUGH: 0

## 2022-05-11 NOTE — PROGRESS NOTES
Cyndie White (:  1960) is a 60 y.o. male,, here for evaluation of the following chief complaint(s):  6 Month Follow-Up (LOV was 2021), Hypertension, and Cerebrovascular Accident         ASSESSMENT/PLAN:  1. Dyslipidemia  2. HTN (hypertension), benign  1. Hyperlipidemia I did review previous blood tests which there is cholesterol numbers are well controlled continue same medicines   2. Hypertension blood pressure was well controlled continue same medicines   3. Achilles tenderness probable tendon sheath inflammation. I did suggest he try to limit excessive activity. He also should use some heat twice daily and if heat is ineffective, may he may try ice he also will take an anti-inflammatory dose of ibuprofen or Aleve for 7 to 10 days. He will call if symptoms are progressive    Return in about 6 months (around 2022). Subjective   SUBJECTIVE/OBJECTIVE:  HPI patient in for follow-up of hypertension and hyperlipidemia. Does have a previous history of a CVA over 3 years ago with no recurrent symptoms. He states he takes his medicines regularly and denies any new significant health issues    Review of Systems   Respiratory: Negative for cough and shortness of breath. Cardiovascular: Negative for chest pain and palpitations. All other systems reviewed and are negative. Objective   Physical Exam  Constitutional:       Appearance: Normal appearance. Neck:      Vascular: No carotid bruit. Cardiovascular:      Rate and Rhythm: Normal rate and regular rhythm. Pulses: Normal pulses. Heart sounds: Normal heart sounds. No murmur heard. Pulmonary:      Effort: Pulmonary effort is normal.      Breath sounds: Normal breath sounds. Abdominal:      General: Abdomen is flat. Bowel sounds are normal.      Palpations: Abdomen is soft. Musculoskeletal:         General: No swelling. Cervical back: Normal range of motion and neck supple.       Comments: He did have some tenderness to the left Achilles and some thickness through that area. He states it is been bothering him somewhat. He is active doing a lot of walking up and down stairs and hills. Neurological:      Mental Status: He is alert. An electronic signature was used to authenticate this note.     --Vinicio Portillo MD

## 2022-10-26 RX ORDER — LISINOPRIL 10 MG/1
TABLET ORAL
Qty: 30 TABLET | Refills: 5 | Status: SHIPPED | OUTPATIENT
Start: 2022-10-26

## 2022-10-26 NOTE — TELEPHONE ENCOUNTER
Requested Prescriptions     Pending Prescriptions Disp Refills    lisinopril (PRINIVIL;ZESTRIL) 10 MG tablet [Pharmacy Med Name: LISINOPRIL 10 MG TABLET] 30 tablet 90     Sig: TAKE ONE TABLET BY MOUTH DAILY    Follow up 11/09

## 2022-11-09 ENCOUNTER — OFFICE VISIT (OUTPATIENT)
Dept: INTERNAL MEDICINE CLINIC | Age: 62
End: 2022-11-09

## 2022-11-09 VITALS
HEIGHT: 72 IN | TEMPERATURE: 78 F | WEIGHT: 228 LBS | BODY MASS INDEX: 30.88 KG/M2 | SYSTOLIC BLOOD PRESSURE: 120 MMHG | DIASTOLIC BLOOD PRESSURE: 70 MMHG | OXYGEN SATURATION: 98 %

## 2022-11-09 DIAGNOSIS — M70.22 OLECRANON BURSITIS OF LEFT ELBOW: ICD-10-CM

## 2022-11-09 DIAGNOSIS — I10 HTN (HYPERTENSION), BENIGN: ICD-10-CM

## 2022-11-09 DIAGNOSIS — E78.5 DYSLIPIDEMIA: Primary | ICD-10-CM

## 2022-11-09 PROCEDURE — 99213 OFFICE O/P EST LOW 20 MIN: CPT | Performed by: INTERNAL MEDICINE

## 2022-11-09 PROCEDURE — 3078F DIAST BP <80 MM HG: CPT | Performed by: INTERNAL MEDICINE

## 2022-11-09 PROCEDURE — 3074F SYST BP LT 130 MM HG: CPT | Performed by: INTERNAL MEDICINE

## 2022-11-09 ASSESSMENT — ENCOUNTER SYMPTOMS
COUGH: 0
SHORTNESS OF BREATH: 0

## 2022-11-09 NOTE — PROGRESS NOTES
Ileana Madera (:  1960) is a 60 y.o. male,, here for evaluation of the following chief complaint(s):  Check-Up (Not sleeping well)         ASSESSMENT/PLAN:  1. Dyslipidemia  2. HTN (hypertension), benign  1. Hyperlipidemia patient's blood test from a year ago were well controlled. Will continue same medicines and consider repeat blood test in 6 to 12 months. 2.  Hypertension well-controlled continue same medicines  3. Olecranon bursitis probable from repeat injury. I did suggest to use Aleve for per day and heat twice daily for 7 to 10 days. He will call if symptoms are progressive    Return in about 6 months (around 2023). Subjective   SUBJECTIVE/OBJECTIVE:  HPI patient in for follow-up of hypertension and hyperlipidemia. He states he does have some pain in his left elbow after injuring it by bumping it 3 months ago. He occasionally reinjures it and it does hurt when he is trying to sleep at nighttime. He denies any other new symptoms specifically denying chest pain palpitation or TIA symptoms. Review of Systems   Respiratory:  Negative for cough and shortness of breath. Cardiovascular:  Negative for chest pain and palpitations. All other systems reviewed and are negative. Objective   Physical Exam  Vitals reviewed. Neck:      Vascular: No carotid bruit. Cardiovascular:      Rate and Rhythm: Normal rate and regular rhythm. Pulses: Normal pulses. Heart sounds: Normal heart sounds. No murmur heard. Pulmonary:      Effort: Pulmonary effort is normal.      Breath sounds: Normal breath sounds. Abdominal:      General: Abdomen is flat. Palpations: Abdomen is soft. Musculoskeletal:         General: Swelling and tenderness present. Comments: He does have some thickness at the left olecranon bursa and mild tenderness to palpation. There was no significant tenderness to the olecranon bony structure.                 An electronic signature was used to authenticate this note.     --Madelyn López MD

## 2023-03-29 RX ORDER — ATORVASTATIN CALCIUM 40 MG/1
TABLET, FILM COATED ORAL
Qty: 90 TABLET | Refills: 3 | Status: SHIPPED | OUTPATIENT
Start: 2023-03-29

## 2023-03-29 NOTE — TELEPHONE ENCOUNTER
Requested Prescriptions     Pending Prescriptions Disp Refills    atorvastatin (LIPITOR) 40 MG tablet [Pharmacy Med Name: ATORVASTATIN 40 MG TABLET] 90 tablet 3     Sig: TAKE ONE TABLET BY MOUTH ONCE NIGHTLY      Due for a cholesterol screen   Follow up scheduled 04/19/2023

## 2023-04-25 RX ORDER — LISINOPRIL 10 MG/1
10 TABLET ORAL DAILY
Qty: 30 TABLET | Refills: 0 | Status: CANCELLED | OUTPATIENT
Start: 2023-04-25

## 2023-05-01 NOTE — TELEPHONE ENCOUNTER
Due for a follow up, missed last appointment     Requested Prescriptions     Pending Prescriptions Disp Refills    lisinopril (PRINIVIL;ZESTRIL) 10 MG tablet 30 tablet 0     Sig: Take 1 tablet by mouth daily

## 2023-05-03 RX ORDER — LISINOPRIL 10 MG/1
10 TABLET ORAL DAILY
Qty: 30 TABLET | Refills: 0 | Status: SHIPPED | OUTPATIENT
Start: 2023-05-03

## 2023-07-06 RX ORDER — LISINOPRIL 10 MG/1
10 TABLET ORAL DAILY
Qty: 30 TABLET | Refills: 1 | Status: SHIPPED | OUTPATIENT
Start: 2023-07-06

## 2023-07-06 NOTE — TELEPHONE ENCOUNTER
Requested Prescriptions     Pending Prescriptions Disp Refills    lisinopril (PRINIVIL;ZESTRIL) 10 MG tablet 30 tablet 1     Sig: Take 1 tablet by mouth daily

## 2023-08-14 ENCOUNTER — OFFICE VISIT (OUTPATIENT)
Dept: INTERNAL MEDICINE CLINIC | Age: 63
End: 2023-08-14

## 2023-08-14 VITALS
BODY MASS INDEX: 31.69 KG/M2 | WEIGHT: 234 LBS | HEIGHT: 72 IN | SYSTOLIC BLOOD PRESSURE: 122 MMHG | DIASTOLIC BLOOD PRESSURE: 82 MMHG | HEART RATE: 76 BPM | OXYGEN SATURATION: 97 %

## 2023-08-14 DIAGNOSIS — M72.2 PLANTAR FASCIITIS OF RIGHT FOOT: ICD-10-CM

## 2023-08-14 DIAGNOSIS — E78.5 DYSLIPIDEMIA: ICD-10-CM

## 2023-08-14 DIAGNOSIS — I10 HTN (HYPERTENSION), BENIGN: Primary | ICD-10-CM

## 2023-08-14 DIAGNOSIS — I63.111 CEREBROVASCULAR ACCIDENT (CVA) DUE TO EMBOLISM OF RIGHT VERTEBRAL ARTERY (HCC): ICD-10-CM

## 2023-08-14 PROCEDURE — 99214 OFFICE O/P EST MOD 30 MIN: CPT | Performed by: INTERNAL MEDICINE

## 2023-08-14 PROCEDURE — 3074F SYST BP LT 130 MM HG: CPT | Performed by: INTERNAL MEDICINE

## 2023-08-14 PROCEDURE — 3078F DIAST BP <80 MM HG: CPT | Performed by: INTERNAL MEDICINE

## 2023-08-14 RX ORDER — ASPIRIN 325 MG
81 TABLET, DELAYED RELEASE (ENTERIC COATED) ORAL DAILY
Qty: 30 TABLET | Refills: 5 | Status: CANCELLED | OUTPATIENT
Start: 2023-08-14

## 2023-08-14 RX ORDER — LISINOPRIL 10 MG/1
10 TABLET ORAL DAILY
Qty: 90 TABLET | Refills: 1 | Status: SHIPPED | OUTPATIENT
Start: 2023-08-14

## 2023-08-14 ASSESSMENT — ENCOUNTER SYMPTOMS
GASTROINTESTINAL NEGATIVE: 1
ALLERGIC/IMMUNOLOGIC NEGATIVE: 1
RESPIRATORY NEGATIVE: 1

## 2023-08-14 NOTE — PATIENT INSTRUCTIONS
Plantar Fasciitis of Right Foot. Stretch and ice as discussed. Ibuprphen or Tylenol if needed. Call if worsens. 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 Continue to improve diet and exercise. Continue Asa 81 mg daily. Call if new c/o. Htn (Hypertension), Benign. Continue present meds. Home BP checks. Call if>140/90. Goal is <130/80. Improve diet. Avoid caffeine and salt. Call if new c/o w/ meds. Dyslipidemia. Will do labs and adjust meds if needed. To improve diet and exercise as tolerated. Lose some weight as discussed. Call if new c/o.

## 2023-08-14 NOTE — ASSESSMENT & PLAN NOTE
Most recent cholesterol results were 11/2021. The diet has been stable. The wt has stable. No change in meds. No c/o with meds. Next labs are due soon.

## 2023-08-14 NOTE — PROGRESS NOTES
Subjective:      Patient ID: Aleksandr Meza is a 58 y.o. male. HPI  HTN (hypertension), benign   No change in meds, no c/o with meds, no chest pain, SOB, palpatations, or syncope. Home bp was not checked. Chronic cough     Dyslipidemia   Most recent cholesterol results were 11/2021. The diet has been stable. The wt has stable. No change in meds. No c/o with meds. Next labs are due soon. Cerebrovascular accident (CVA) due to embolism of right vertebral artery (HCC)   No recent c/o. No long term symptoms the pt is aware of. Was on coumadin for a couple yrs but now off and on Asa 81 mg daily. Stroke has been at least 3 yrs ago. Plantar fasciitis of right foot   Began 6 mo ago after jumping rope. Seems to be worse and more frequent recently. No tx at present. Review of Systems   Constitutional: Negative. Respiratory: Negative. Cardiovascular: Negative. Gastrointestinal: Negative. Musculoskeletal: Negative. Skin: Negative. Allergic/Immunologic: Negative. Neurological: Negative. Hematological: Negative. Psychiatric/Behavioral: Negative. Vitals:    08/14/23 1323 08/14/23 1349   BP: 124/78 122/82   Pulse: 76    SpO2: 97%    Weight: 234 lb (106.1 kg)    Height: 6' (1.829 m)      Objective:   Physical Exam  Constitutional:       General: He is not in acute distress. Appearance: Normal appearance. He is well-developed. He is not ill-appearing, toxic-appearing or diaphoretic. HENT:      Head: Normocephalic and atraumatic. Eyes:      Extraocular Movements: Extraocular movements intact. Conjunctiva/sclera: Conjunctivae normal.      Pupils: Pupils are equal, round, and reactive to light. Neck:      Thyroid: No thyroid mass or thyromegaly. Vascular: Normal carotid pulses. No carotid bruit, hepatojugular reflux or JVD. Trachea: Trachea and phonation normal.   Cardiovascular:      Rate and Rhythm: Normal rate and regular rhythm. No extrasystoles are present.

## 2023-08-14 NOTE — ASSESSMENT & PLAN NOTE
No change in meds, no c/o with meds, no chest pain, SOB, palpatations, or syncope. Home bp was not checked.  Chronic cough

## 2023-08-14 NOTE — ASSESSMENT & PLAN NOTE
No recent c/o. No long term symptoms the pt is aware of. Was on coumadin for a couple yrs but now off and on Asa 81 mg daily. Stroke has been at least 3 yrs ago.

## 2024-04-24 RX ORDER — LISINOPRIL 10 MG/1
10 TABLET ORAL DAILY
Qty: 30 TABLET | Refills: 0 | Status: SHIPPED | OUTPATIENT
Start: 2024-04-24

## 2024-04-24 NOTE — TELEPHONE ENCOUNTER
Requested Prescriptions     Pending Prescriptions Disp Refills    lisinopril (PRINIVIL;ZESTRIL) 10 MG tablet [Pharmacy Med Name: LISINOPRIL 10 MG TABLET] 30 tablet 0     Sig: TAKE 1 TABLET BY MOUTH DAILY    Due for labs and a follow up

## 2024-05-29 NOTE — TELEPHONE ENCOUNTER
Requested Prescriptions     Pending Prescriptions Disp Refills    lisinopril (PRINIVIL;ZESTRIL) 10 MG tablet 30 tablet 1     Sig: Take 1 tablet by mouth daily    Will call for an appointment

## 2024-05-30 RX ORDER — LISINOPRIL 10 MG/1
10 TABLET ORAL DAILY
Qty: 30 TABLET | Refills: 1 | Status: SHIPPED | OUTPATIENT
Start: 2024-05-30

## 2024-06-13 RX ORDER — ATORVASTATIN CALCIUM 40 MG/1
TABLET, FILM COATED ORAL
Qty: 90 TABLET | Refills: 3 | Status: SHIPPED | OUTPATIENT
Start: 2024-06-13

## 2024-06-13 NOTE — TELEPHONE ENCOUNTER
Requested Prescriptions     Pending Prescriptions Disp Refills    atorvastatin (LIPITOR) 40 MG tablet [Pharmacy Med Name: ATORVASTATIN 40 MG TABLET] 90 tablet 3     Sig: TAKE ONE TABLET BY MOUTH ONCE NIGHTLY    Pt due for med, but needs appt. Will give him a call to schedule

## 2024-07-01 ENCOUNTER — OFFICE VISIT (OUTPATIENT)
Dept: INTERNAL MEDICINE CLINIC | Age: 64
End: 2024-07-01

## 2024-07-01 VITALS
OXYGEN SATURATION: 95 % | WEIGHT: 234 LBS | HEART RATE: 89 BPM | HEIGHT: 72 IN | SYSTOLIC BLOOD PRESSURE: 138 MMHG | DIASTOLIC BLOOD PRESSURE: 83 MMHG | BODY MASS INDEX: 31.69 KG/M2

## 2024-07-01 DIAGNOSIS — M72.2 PLANTAR FASCIITIS OF LEFT FOOT: ICD-10-CM

## 2024-07-01 DIAGNOSIS — I10 HTN (HYPERTENSION), BENIGN: ICD-10-CM

## 2024-07-01 DIAGNOSIS — E78.5 DYSLIPIDEMIA: Primary | ICD-10-CM

## 2024-07-01 DIAGNOSIS — I63.111 CEREBROVASCULAR ACCIDENT (CVA) DUE TO EMBOLISM OF RIGHT VERTEBRAL ARTERY (HCC): ICD-10-CM

## 2024-07-01 PROCEDURE — 99213 OFFICE O/P EST LOW 20 MIN: CPT | Performed by: INTERNAL MEDICINE

## 2024-07-01 PROCEDURE — 3075F SYST BP GE 130 - 139MM HG: CPT | Performed by: INTERNAL MEDICINE

## 2024-07-01 PROCEDURE — 3079F DIAST BP 80-89 MM HG: CPT | Performed by: INTERNAL MEDICINE

## 2024-07-01 PROCEDURE — 1123F ACP DISCUSS/DSCN MKR DOCD: CPT | Performed by: INTERNAL MEDICINE

## 2024-07-01 RX ORDER — LISINOPRIL 20 MG/1
20 TABLET ORAL DAILY
Qty: 90 TABLET | Refills: 3 | Status: SHIPPED | OUTPATIENT
Start: 2024-07-01

## 2024-07-01 RX ORDER — ATORVASTATIN CALCIUM 40 MG/1
40 TABLET, FILM COATED ORAL NIGHTLY
Qty: 90 TABLET | Refills: 3 | Status: SHIPPED | OUTPATIENT
Start: 2024-07-01

## 2024-07-01 NOTE — PROGRESS NOTES
SUBJECTIVE:  Follow up from 8/14/23 (Dr Novoa) for hypertesion on lisinopril and dyslipidemia on atorvastatin.  History of CVA due to embolism of right vertebral artery about four years ago.  Previously on warfarin for a couple of years but now on aspirin 81 mg daily but he is not taking.        MEDICATIONS:  atorvastatin (LIPITOR) 40 MG tablet TAKE ONE TABLET BY MOUTH ONCE NIGHTLY   lisinopril (PRINIVIL;ZESTRIL) 10 MG tablet Take 1 tablet by mouth daily   aspirin 325 MG EC tablet Take 1 tablet by mouth daily     Lab Results   Component Value Date    LABA1C 5.5 10/02/2017     Lab Results   Component Value Date    WBC 8.1 09/14/2021    HGB 15.8 09/14/2021     09/14/2021    MCV 85.6 09/14/2021     Lab Results   Component Value Date     09/14/2021    K 4.1 09/14/2021     09/14/2021    CO2 26 09/14/2021    BUN 12 09/14/2021    CREATININE 0.8 09/14/2021    GLUCOSE 98 09/14/2021       OBJECTIVE:    /83 (Site: Left Lower Arm)   Pulse 89   Ht 1.829 m (6')   Wt 106.1 kg (234 lb)   SpO2 95%   BMI 31.74 kg/m²   HEENT:  Oropharynx clear, no lymphadenopathy,   LUNGS:  Clear and without wheezes  HEART:  Regular rhythm, no appreciable murmur  ABD:  Benign, active bowel sounds  EXT:  No edema, pulses palpable  NEURO:  No focal neurologic deficits noted.    ASSESSMENT / PLAN:   History of embolic stroke:  START back taking aspirin 81 mg daily.   Primary hypertension:  Blood pressure borderline today at 138/83, goal less than 130/80.  Continue taking lisinopril and increase from 10 mg to 20 mg daily.    Dyslipidemia (cholesterol):  Continue taking atorvastatin (Lipitor) 40 mg daily.  LDL cholesterol was 59 (goal less than 70) in November 2021.    Left foot plantar fasciitis:  Stretch the foot by rolling the toes up in the morning of after off your feet for some time.      Follow-up as needed or six months

## 2024-07-01 NOTE — PROGRESS NOTES
Requested Prescriptions     Pending Prescriptions Disp Refills    atorvastatin (LIPITOR) 40 MG tablet 90 tablet 3     Sig: Take 1 tablet by mouth nightly    lisinopril (PRINIVIL;ZESTRIL) 10 MG tablet 90 tablet 3     Sig: Take 1 tablet by mouth daily

## 2024-07-01 NOTE — PATIENT INSTRUCTIONS
History of embolic stroke:  START back taking aspirin 81 mg daily.   Primary hypertension:  Blood pressure borderline today at 138/83, goal less than 130/80.  Continue taking lisinopril and increase from 10 mg to 20 mg daily.    Dyslipidemia (cholesterol):  Continue taking atorvastatin (Lipitor) 40 mg daily.  LDL cholesterol was 59 (goal less than 70) in November 2021.    Left foot plantar fasciitis:  Stretch the foot by rolling the toes up in the morning of after off your feet for some time.      Follow-up as needed or six months

## 2024-08-29 ENCOUNTER — ANTI-COAG VISIT (OUTPATIENT)
Dept: PHARMACY | Age: 64
End: 2024-08-29

## 2025-07-07 RX ORDER — LISINOPRIL 20 MG/1
20 TABLET ORAL DAILY
Qty: 90 TABLET | Refills: 0 | Status: SHIPPED | OUTPATIENT
Start: 2025-07-07

## 2025-07-07 NOTE — TELEPHONE ENCOUNTER
Faxed received for a refill. I called  patient to get him scheduled for a follow up. Last visit was 07/01/2024    Requested Prescriptions     Pending Prescriptions Disp Refills    lisinopril (PRINIVIL;ZESTRIL) 20 MG tablet 90 tablet 3     Sig: Take 1 tablet by mouth daily